# Patient Record
Sex: MALE | Race: OTHER | HISPANIC OR LATINO | URBAN - METROPOLITAN AREA
[De-identification: names, ages, dates, MRNs, and addresses within clinical notes are randomized per-mention and may not be internally consistent; named-entity substitution may affect disease eponyms.]

---

## 2017-01-13 ENCOUNTER — OUTPATIENT (OUTPATIENT)
Dept: OUTPATIENT SERVICES | Facility: HOSPITAL | Age: 47
LOS: 1 days | End: 2017-01-13
Payer: MEDICARE

## 2017-01-13 DIAGNOSIS — K08.9 DISORDER OF TEETH AND SUPPORTING STRUCTURES, UNSPECIFIED: ICD-10-CM

## 2017-01-13 PROCEDURE — D2391: CPT

## 2017-01-20 DIAGNOSIS — K02.9 DENTAL CARIES, UNSPECIFIED: ICD-10-CM

## 2017-01-25 ENCOUNTER — LABORATORY RESULT (OUTPATIENT)
Age: 47
End: 2017-01-25

## 2017-01-25 ENCOUNTER — APPOINTMENT (OUTPATIENT)
Dept: INFECTIOUS DISEASE | Facility: CLINIC | Age: 47
End: 2017-01-25

## 2017-01-25 ENCOUNTER — OUTPATIENT (OUTPATIENT)
Dept: OUTPATIENT SERVICES | Facility: HOSPITAL | Age: 47
LOS: 1 days | End: 2017-01-25
Payer: MEDICARE

## 2017-01-25 VITALS
TEMPERATURE: 98.2 F | SYSTOLIC BLOOD PRESSURE: 122 MMHG | OXYGEN SATURATION: 97 % | HEIGHT: 68 IN | WEIGHT: 182 LBS | BODY MASS INDEX: 27.58 KG/M2 | DIASTOLIC BLOOD PRESSURE: 71 MMHG | HEART RATE: 74 BPM

## 2017-01-25 DIAGNOSIS — B20 HUMAN IMMUNODEFICIENCY VIRUS [HIV] DISEASE: ICD-10-CM

## 2017-01-25 LAB
APPEARANCE UR: ABNORMAL
BACTERIA # UR AUTO: NEGATIVE — SIGNIFICANT CHANGE UP
BILIRUB UR-MCNC: NEGATIVE — SIGNIFICANT CHANGE UP
COLOR SPEC: YELLOW — SIGNIFICANT CHANGE UP
DIFF PNL FLD: NEGATIVE — SIGNIFICANT CHANGE UP
EPI CELLS # UR: 0 /HPF — SIGNIFICANT CHANGE UP (ref 0–5)
GLUCOSE UR QL: NEGATIVE MG/DL — SIGNIFICANT CHANGE UP
HCT VFR BLD CALC: 45.3 % — SIGNIFICANT CHANGE UP (ref 39–50)
HGB BLD-MCNC: 14.5 G/DL — SIGNIFICANT CHANGE UP (ref 13–17)
HYALINE CASTS # UR AUTO: 1 /LPF — SIGNIFICANT CHANGE UP (ref 0–7)
KETONES UR-MCNC: NEGATIVE — SIGNIFICANT CHANGE UP
LEUKOCYTE ESTERASE UR-ACNC: NEGATIVE — SIGNIFICANT CHANGE UP
MCHC RBC-ENTMCNC: 30.7 PG — SIGNIFICANT CHANGE UP (ref 27–34)
MCHC RBC-ENTMCNC: 32 GM/DL — SIGNIFICANT CHANGE UP (ref 32–36)
MCV RBC AUTO: 96 FL — SIGNIFICANT CHANGE UP (ref 80–100)
NITRITE UR-MCNC: NEGATIVE — SIGNIFICANT CHANGE UP
PH UR: 7.5 — SIGNIFICANT CHANGE UP (ref 5–8)
PLATELET # BLD AUTO: 186 K/UL — SIGNIFICANT CHANGE UP (ref 150–400)
PROT UR-MCNC: NEGATIVE MG/DL — SIGNIFICANT CHANGE UP
RBC # BLD: 4.72 M/UL — SIGNIFICANT CHANGE UP (ref 4.2–5.8)
RBC # FLD: 13.5 % — SIGNIFICANT CHANGE UP (ref 10.3–14.5)
RBC CASTS # UR COMP ASSIST: 2 /HPF — SIGNIFICANT CHANGE UP (ref 0–4)
SP GR SPEC: 1.02 — SIGNIFICANT CHANGE UP (ref 1.01–1.02)
UROBILINOGEN FLD QL: NEGATIVE MG/DL — SIGNIFICANT CHANGE UP
WBC # BLD: 6.43 K/UL — SIGNIFICANT CHANGE UP (ref 3.8–10.5)
WBC # FLD AUTO: 6.43 K/UL — SIGNIFICANT CHANGE UP (ref 3.8–10.5)
WBC UR QL: 0 /HPF — SIGNIFICANT CHANGE UP (ref 0–5)

## 2017-01-25 PROCEDURE — 80061 LIPID PANEL: CPT

## 2017-01-25 PROCEDURE — 90834 PSYTX W PT 45 MINUTES: CPT

## 2017-01-25 PROCEDURE — 85027 COMPLETE CBC AUTOMATED: CPT

## 2017-01-25 PROCEDURE — 86803 HEPATITIS C AB TEST: CPT

## 2017-01-25 PROCEDURE — 36415 COLL VENOUS BLD VENIPUNCTURE: CPT

## 2017-01-25 PROCEDURE — G0103: CPT

## 2017-01-25 PROCEDURE — 84153 ASSAY OF PSA TOTAL: CPT

## 2017-01-25 PROCEDURE — 87491 CHLMYD TRACH DNA AMP PROBE: CPT

## 2017-01-25 PROCEDURE — 82306 VITAMIN D 25 HYDROXY: CPT

## 2017-01-25 PROCEDURE — 83036 HEMOGLOBIN GLYCOSYLATED A1C: CPT

## 2017-01-25 PROCEDURE — G0463: CPT | Mod: 25

## 2017-01-25 PROCEDURE — 86780 TREPONEMA PALLIDUM: CPT

## 2017-01-25 PROCEDURE — 86360 T CELL ABSOLUTE COUNT/RATIO: CPT

## 2017-01-25 PROCEDURE — 81001 URINALYSIS AUTO W/SCOPE: CPT

## 2017-01-25 PROCEDURE — 80053 COMPREHEN METABOLIC PANEL: CPT

## 2017-01-25 PROCEDURE — 86480 TB TEST CELL IMMUN MEASURE: CPT

## 2017-01-25 PROCEDURE — 87591 N.GONORRHOEAE DNA AMP PROB: CPT

## 2017-01-25 PROCEDURE — 87536 HIV-1 QUANT&REVRSE TRNSCRPJ: CPT

## 2017-01-26 LAB
24R-OH-CALCIDIOL SERPL-MCNC: 26.4 NG/ML — LOW (ref 30–100)
4/8 RATIO: 0.84 RATIO — LOW (ref 0.9–3.6)
ABS CD8: 619 /UL — SIGNIFICANT CHANGE UP (ref 136–757)
ALBUMIN SERPL ELPH-MCNC: 4.2 G/DL — SIGNIFICANT CHANGE UP (ref 3.3–5)
ALP SERPL-CCNC: 70 U/L — SIGNIFICANT CHANGE UP (ref 40–120)
ALT FLD-CCNC: 33 U/L — SIGNIFICANT CHANGE UP (ref 10–45)
ANION GAP SERPL CALC-SCNC: 12 MMOL/L — SIGNIFICANT CHANGE UP (ref 5–17)
AST SERPL-CCNC: 31 U/L — SIGNIFICANT CHANGE UP (ref 10–40)
BILIRUB SERPL-MCNC: 0.6 MG/DL — SIGNIFICANT CHANGE UP (ref 0.2–1.2)
BUN SERPL-MCNC: 12 MG/DL — SIGNIFICANT CHANGE UP (ref 7–23)
C TRACH RRNA SPEC QL NAA+PROBE: SIGNIFICANT CHANGE UP
C TRACH RRNA SPEC QL NAA+PROBE: SIGNIFICANT CHANGE UP
CALCIUM SERPL-MCNC: 9.2 MG/DL — SIGNIFICANT CHANGE UP (ref 8.4–10.5)
CD3 BLASTS SPEC-ACNC: 1154 /UL — SIGNIFICANT CHANGE UP (ref 799–2171)
CD3 BLASTS SPEC-ACNC: 64 % — SIGNIFICANT CHANGE UP (ref 59–85)
CD4 %: 29 % — LOW (ref 36–65)
CD8 %: 34 % — SIGNIFICANT CHANGE UP (ref 11–36)
CHLORIDE SERPL-SCNC: 103 MMOL/L — SIGNIFICANT CHANGE UP (ref 96–108)
CHOLEST SERPL-MCNC: 157 MG/DL — SIGNIFICANT CHANGE UP (ref 10–199)
CO2 SERPL-SCNC: 26 MMOL/L — SIGNIFICANT CHANGE UP (ref 22–31)
CREAT SERPL-MCNC: 1.06 MG/DL — SIGNIFICANT CHANGE UP (ref 0.5–1.3)
GC AMPLIFICATION INTERPRETATION: SIGNIFICANT CHANGE UP
GLUCOSE SERPL-MCNC: 71 MG/DL — SIGNIFICANT CHANGE UP (ref 70–99)
HBA1C BLD-MCNC: 5.4 % — SIGNIFICANT CHANGE UP (ref 4–5.6)
HCV AB S/CO SERPL IA: 0.15 S/CO — SIGNIFICANT CHANGE UP
HCV AB SERPL-IMP: SIGNIFICANT CHANGE UP
HDLC SERPL-MCNC: 44 MG/DL — SIGNIFICANT CHANGE UP (ref 40–125)
HIV1 RNA # SERPL NAA+PROBE: SIGNIFICANT CHANGE UP
HIV1 RNA SERPL NAA+PROBE-LOG#: SIGNIFICANT CHANGE UP LG10COP/ML
LIPID PNL WITH DIRECT LDL SERPL: 75 MG/DL — SIGNIFICANT CHANGE UP
N GONORRHOEA RRNA SPEC QL NAA+PROBE: SIGNIFICANT CHANGE UP
POTASSIUM SERPL-MCNC: 4.6 MMOL/L — SIGNIFICANT CHANGE UP (ref 3.5–5.3)
POTASSIUM SERPL-SCNC: 4.6 MMOL/L — SIGNIFICANT CHANGE UP (ref 3.5–5.3)
PROT SERPL-MCNC: 7.5 G/DL — SIGNIFICANT CHANGE UP (ref 6–8.3)
PSA FLD-MCNC: 0.65 NG/ML — SIGNIFICANT CHANGE UP (ref 0–4)
PSA FREE MFR FLD: 0.65 NG/ML — SIGNIFICANT CHANGE UP (ref 0–4)
SODIUM SERPL-SCNC: 141 MMOL/L — SIGNIFICANT CHANGE UP (ref 135–145)
SPECIMEN SOURCE: SIGNIFICANT CHANGE UP
T PALLIDUM AB TITR SER: NEGATIVE — SIGNIFICANT CHANGE UP
T-CELL CD4 SUBSET PNL BLD: 521 /UL — SIGNIFICANT CHANGE UP (ref 489–1457)
TOTAL CHOLESTEROL/HDL RATIO MEASUREMENT: 3.6 RATIO — SIGNIFICANT CHANGE UP (ref 3.4–9.6)
TRIGL SERPL-MCNC: 189 MG/DL — HIGH (ref 10–149)

## 2017-01-27 LAB
M TB TUBERC IFN-G BLD QL: -0.02 IU/ML — SIGNIFICANT CHANGE UP
M TB TUBERC IFN-G BLD QL: 0.1 IU/ML — SIGNIFICANT CHANGE UP
M TB TUBERC IFN-G BLD QL: NEGATIVE — SIGNIFICANT CHANGE UP
MITOGEN IGNF BCKGRD COR BLD-ACNC: >10 IU/ML — SIGNIFICANT CHANGE UP

## 2017-01-30 ENCOUNTER — OUTPATIENT (OUTPATIENT)
Dept: OUTPATIENT SERVICES | Facility: HOSPITAL | Age: 47
LOS: 1 days | End: 2017-01-30
Payer: MEDICARE

## 2017-01-30 DIAGNOSIS — K08.9 DISORDER OF TEETH AND SUPPORTING STRUCTURES, UNSPECIFIED: ICD-10-CM

## 2017-01-30 PROCEDURE — D2391: CPT

## 2017-01-30 PROCEDURE — D2392: CPT

## 2017-01-31 DIAGNOSIS — Z85.71 PERSONAL HISTORY OF HODGKIN LYMPHOMA: ICD-10-CM

## 2017-02-01 DIAGNOSIS — K02.9 DENTAL CARIES, UNSPECIFIED: ICD-10-CM

## 2017-02-03 ENCOUNTER — RX RENEWAL (OUTPATIENT)
Age: 47
End: 2017-02-03

## 2017-02-08 ENCOUNTER — RX RENEWAL (OUTPATIENT)
Age: 47
End: 2017-02-08

## 2017-03-01 ENCOUNTER — OUTPATIENT (OUTPATIENT)
Dept: OUTPATIENT SERVICES | Facility: HOSPITAL | Age: 47
LOS: 1 days | End: 2017-03-01
Payer: MEDICARE

## 2017-03-01 DIAGNOSIS — K08.9 DISORDER OF TEETH AND SUPPORTING STRUCTURES, UNSPECIFIED: ICD-10-CM

## 2017-03-01 PROCEDURE — D2392: CPT

## 2017-03-01 PROCEDURE — D2391: CPT

## 2017-03-02 DIAGNOSIS — K02.9 DENTAL CARIES, UNSPECIFIED: ICD-10-CM

## 2017-04-07 ENCOUNTER — OUTPATIENT (OUTPATIENT)
Dept: OUTPATIENT SERVICES | Facility: HOSPITAL | Age: 47
LOS: 1 days | End: 2017-04-07
Payer: MEDICARE

## 2017-04-07 DIAGNOSIS — K08.9 DISORDER OF TEETH AND SUPPORTING STRUCTURES, UNSPECIFIED: ICD-10-CM

## 2017-04-07 PROCEDURE — D2392: CPT

## 2017-04-10 DIAGNOSIS — K02.9 DENTAL CARIES, UNSPECIFIED: ICD-10-CM

## 2017-05-03 ENCOUNTER — RX RENEWAL (OUTPATIENT)
Age: 47
End: 2017-05-03

## 2017-05-31 ENCOUNTER — APPOINTMENT (OUTPATIENT)
Dept: INFECTIOUS DISEASE | Facility: CLINIC | Age: 47
End: 2017-05-31

## 2017-05-31 ENCOUNTER — OUTPATIENT (OUTPATIENT)
Dept: OUTPATIENT SERVICES | Facility: HOSPITAL | Age: 47
LOS: 1 days | End: 2017-05-31
Payer: MEDICARE

## 2017-05-31 VITALS
SYSTOLIC BLOOD PRESSURE: 132 MMHG | BODY MASS INDEX: 27.89 KG/M2 | DIASTOLIC BLOOD PRESSURE: 86 MMHG | OXYGEN SATURATION: 99 % | WEIGHT: 184 LBS | HEIGHT: 68 IN | HEART RATE: 80 BPM | TEMPERATURE: 97.5 F

## 2017-05-31 DIAGNOSIS — B20 HUMAN IMMUNODEFICIENCY VIRUS [HIV] DISEASE: ICD-10-CM

## 2017-05-31 PROCEDURE — G0463: CPT

## 2017-05-31 RX ORDER — ASCORBIC ACID 500 MG
TABLET,CHEWABLE ORAL
Refills: 0 | Status: ACTIVE | COMMUNITY
Start: 2017-05-31

## 2017-06-01 LAB
25(OH)D3 SERPL-MCNC: 26.4 NG/ML
ALBUMIN SERPL ELPH-MCNC: 4.1 G/DL
ALP BLD-CCNC: 72 U/L
ALT SERPL-CCNC: 22 U/L
ANION GAP SERPL CALC-SCNC: 17 MMOL/L
APPEARANCE: ABNORMAL
AST SERPL-CCNC: 23 U/L
BACTERIA: NEGATIVE
BASOPHILS # BLD AUTO: 0.01 K/UL
BASOPHILS NFR BLD AUTO: 0.1 %
BILIRUB SERPL-MCNC: 0.5 MG/DL
BILIRUBIN URINE: NEGATIVE
BLOOD URINE: NEGATIVE
BUN SERPL-MCNC: 13 MG/DL
C TRACH RRNA SPEC QL NAA+PROBE: NORMAL
CALCIUM SERPL-MCNC: 9.3 MG/DL
CHLORIDE SERPL-SCNC: 101 MMOL/L
CHOLEST SERPL-MCNC: 142 MG/DL
CHOLEST/HDLC SERPL: 3.2 RATIO
CO2 SERPL-SCNC: 24 MMOL/L
COLOR: ABNORMAL
CREAT SERPL-MCNC: 1.15 MG/DL
EOSINOPHIL # BLD AUTO: 0.08 K/UL
EOSINOPHIL NFR BLD AUTO: 1.1 %
GLUCOSE QUALITATIVE U: NORMAL MG/DL
GLUCOSE SERPL-MCNC: 72 MG/DL
HBA1C MFR BLD HPLC: 5.5 %
HCT VFR BLD CALC: 43.5 %
HCV AB SER QL: NONREACTIVE
HCV S/CO RATIO: 0.16 S/CO
HDLC SERPL-MCNC: 44 MG/DL
HGB BLD-MCNC: 14.4 G/DL
HYALINE CASTS: 0 /LPF
IMM GRANULOCYTES NFR BLD AUTO: 0.1 %
KETONES URINE: NEGATIVE
LDLC SERPL CALC-MCNC: 70 MG/DL
LEUKOCYTE ESTERASE URINE: NEGATIVE
LYMPHOCYTES # BLD AUTO: 1.69 K/UL
LYMPHOCYTES NFR BLD AUTO: 23.6 %
MAN DIFF?: NORMAL
MCHC RBC-ENTMCNC: 30.3 PG
MCHC RBC-ENTMCNC: 33.1 GM/DL
MCV RBC AUTO: 91.4 FL
MICROSCOPIC-UA: NORMAL
MONOCYTES # BLD AUTO: 0.45 K/UL
MONOCYTES NFR BLD AUTO: 6.3 %
N GONORRHOEA RRNA SPEC QL NAA+PROBE: NORMAL
NEUTROPHILS # BLD AUTO: 4.91 K/UL
NEUTROPHILS NFR BLD AUTO: 68.8 %
NITRITE URINE: NEGATIVE
PH URINE: 7.5
PLATELET # BLD AUTO: 198 K/UL
POTASSIUM SERPL-SCNC: 4.3 MMOL/L
PROT SERPL-MCNC: 7.7 G/DL
PROTEIN URINE: NEGATIVE MG/DL
RBC # BLD: 4.76 M/UL
RBC # FLD: 13.2 %
RED BLOOD CELLS URINE: 5 /HPF
SODIUM SERPL-SCNC: 142 MMOL/L
SOURCE AMPLIFICATION: NORMAL
SPECIFIC GRAVITY URINE: 1.02
SQUAMOUS EPITHELIAL CELLS: 1 /HPF
T PALLIDUM AB SER QL IA: NEGATIVE
TRIGL SERPL-MCNC: 138 MG/DL
URINE COMMENTS: NORMAL
UROBILINOGEN URINE: 1 MG/DL
WBC # FLD AUTO: 7.15 K/UL
WHITE BLOOD CELLS URINE: 0 /HPF

## 2017-06-02 LAB
ADJUSTED MITOGEN: >10 IU/ML
ADJUSTED TB AG: 0 IU/ML
CD3 CELLS # BLD: 1169 /UL
CD3 CELLS NFR BLD: 64 %
CD3+CD4+ CELLS # BLD: 524 /UL
CD3+CD4+ CELLS NFR BLD: 29 %
CD3+CD4+ CELLS/CD3+CD8+ CLL SPEC: 0.84 RATIO
CD3+CD8+ CELLS # SPEC: 624 /UL
CD3+CD8+ CELLS NFR BLD: 34 %
M TB IFN-G BLD-IMP: NEGATIVE
QUANTIFERON GOLD NIL: 0.04 IU/ML

## 2017-06-03 LAB
HIV1 RNA # SERPL NAA+PROBE: NOT DETECTED COPIES/ML
VIRAL LOAD LOG: NOT DETECTED LG10COP/ML

## 2017-06-06 DIAGNOSIS — Z85.71 PERSONAL HISTORY OF HODGKIN LYMPHOMA: ICD-10-CM

## 2017-06-06 DIAGNOSIS — R03.0 ELEVATED BLOOD-PRESSURE READING, WITHOUT DIAGNOSIS OF HYPERTENSION: ICD-10-CM

## 2017-06-21 ENCOUNTER — OUTPATIENT (OUTPATIENT)
Dept: OUTPATIENT SERVICES | Facility: HOSPITAL | Age: 47
LOS: 1 days | End: 2017-06-21
Payer: MEDICARE

## 2017-06-21 DIAGNOSIS — K08.9 DISORDER OF TEETH AND SUPPORTING STRUCTURES, UNSPECIFIED: ICD-10-CM

## 2017-06-21 PROCEDURE — D0120: CPT

## 2017-06-21 PROCEDURE — D1110: CPT

## 2017-06-22 DIAGNOSIS — Z01.20 ENCOUNTER FOR DENTAL EXAMINATION AND CLEANING WITHOUT ABNORMAL FINDINGS: ICD-10-CM

## 2017-07-03 ENCOUNTER — RX RENEWAL (OUTPATIENT)
Age: 47
End: 2017-07-03

## 2017-07-14 ENCOUNTER — RX RENEWAL (OUTPATIENT)
Age: 47
End: 2017-07-14

## 2017-07-19 ENCOUNTER — OUTPATIENT (OUTPATIENT)
Dept: OUTPATIENT SERVICES | Facility: HOSPITAL | Age: 47
LOS: 1 days | End: 2017-07-19
Payer: MEDICARE

## 2017-07-19 DIAGNOSIS — K08.9 DISORDER OF TEETH AND SUPPORTING STRUCTURES, UNSPECIFIED: ICD-10-CM

## 2017-07-19 PROCEDURE — D2391: CPT

## 2017-07-21 DIAGNOSIS — K02.9 DENTAL CARIES, UNSPECIFIED: ICD-10-CM

## 2017-09-01 ENCOUNTER — RX RENEWAL (OUTPATIENT)
Age: 47
End: 2017-09-01

## 2017-11-15 ENCOUNTER — MEDICATION RENEWAL (OUTPATIENT)
Age: 47
End: 2017-11-15

## 2017-11-16 ENCOUNTER — MEDICATION RENEWAL (OUTPATIENT)
Age: 47
End: 2017-11-16

## 2017-12-05 ENCOUNTER — TRANSCRIPTION ENCOUNTER (OUTPATIENT)
Age: 47
End: 2017-12-05

## 2017-12-06 ENCOUNTER — OUTPATIENT (OUTPATIENT)
Dept: OUTPATIENT SERVICES | Facility: HOSPITAL | Age: 47
LOS: 1 days | End: 2017-12-06
Payer: MEDICARE

## 2017-12-06 ENCOUNTER — LABORATORY RESULT (OUTPATIENT)
Age: 47
End: 2017-12-06

## 2017-12-06 ENCOUNTER — APPOINTMENT (OUTPATIENT)
Dept: INFECTIOUS DISEASE | Facility: CLINIC | Age: 47
End: 2017-12-06
Payer: MEDICARE

## 2017-12-06 VITALS
BODY MASS INDEX: 27.89 KG/M2 | WEIGHT: 184 LBS | HEIGHT: 68 IN | OXYGEN SATURATION: 99 % | DIASTOLIC BLOOD PRESSURE: 84 MMHG | SYSTOLIC BLOOD PRESSURE: 122 MMHG | HEART RATE: 88 BPM | TEMPERATURE: 98.4 F

## 2017-12-06 DIAGNOSIS — B20 HUMAN IMMUNODEFICIENCY VIRUS [HIV] DISEASE: ICD-10-CM

## 2017-12-06 LAB
24R-OH-CALCIDIOL SERPL-MCNC: 27.3 NG/ML — LOW (ref 30–80)
ALBUMIN SERPL ELPH-MCNC: 4.2 G/DL — SIGNIFICANT CHANGE UP (ref 3.3–5)
ALP SERPL-CCNC: 71 U/L — SIGNIFICANT CHANGE UP (ref 40–120)
ALT FLD-CCNC: 25 U/L — SIGNIFICANT CHANGE UP (ref 10–45)
ANION GAP SERPL CALC-SCNC: 11 MMOL/L — SIGNIFICANT CHANGE UP (ref 5–17)
APPEARANCE UR: CLEAR — SIGNIFICANT CHANGE UP
AST SERPL-CCNC: 23 U/L — SIGNIFICANT CHANGE UP (ref 10–40)
BACTERIA # UR AUTO: NEGATIVE — SIGNIFICANT CHANGE UP
BILIRUB SERPL-MCNC: 0.5 MG/DL — SIGNIFICANT CHANGE UP (ref 0.2–1.2)
BILIRUB UR-MCNC: NEGATIVE — SIGNIFICANT CHANGE UP
BUN SERPL-MCNC: 14 MG/DL — SIGNIFICANT CHANGE UP (ref 7–23)
CALCIUM SERPL-MCNC: 9.4 MG/DL — SIGNIFICANT CHANGE UP (ref 8.4–10.5)
CHLORIDE SERPL-SCNC: 105 MMOL/L — SIGNIFICANT CHANGE UP (ref 96–108)
CHOLEST SERPL-MCNC: 172 MG/DL — SIGNIFICANT CHANGE UP (ref 10–199)
CO2 SERPL-SCNC: 26 MMOL/L — SIGNIFICANT CHANGE UP (ref 22–31)
COLOR SPEC: YELLOW — SIGNIFICANT CHANGE UP
CREAT SERPL-MCNC: 1.14 MG/DL — SIGNIFICANT CHANGE UP (ref 0.5–1.3)
DIFF PNL FLD: NEGATIVE — SIGNIFICANT CHANGE UP
EPI CELLS # UR: 0 /HPF — SIGNIFICANT CHANGE UP (ref 0–5)
GLUCOSE SERPL-MCNC: 91 MG/DL — SIGNIFICANT CHANGE UP (ref 70–99)
GLUCOSE UR QL: NEGATIVE MG/DL — SIGNIFICANT CHANGE UP
HBA1C BLD-MCNC: 5.3 % — SIGNIFICANT CHANGE UP (ref 4–5.6)
HCT VFR BLD CALC: 45.2 % — SIGNIFICANT CHANGE UP (ref 39–50)
HDLC SERPL-MCNC: 41 MG/DL — SIGNIFICANT CHANGE UP (ref 40–125)
HGB BLD-MCNC: 14.9 G/DL — SIGNIFICANT CHANGE UP (ref 13–17)
HYALINE CASTS # UR AUTO: 0 /LPF — SIGNIFICANT CHANGE UP (ref 0–7)
KETONES UR-MCNC: NEGATIVE — SIGNIFICANT CHANGE UP
LEUKOCYTE ESTERASE UR-ACNC: NEGATIVE — SIGNIFICANT CHANGE UP
LIPID PNL WITH DIRECT LDL SERPL: 93 MG/DL — SIGNIFICANT CHANGE UP
MCHC RBC-ENTMCNC: 30.7 PG — SIGNIFICANT CHANGE UP (ref 27–34)
MCHC RBC-ENTMCNC: 33 GM/DL — SIGNIFICANT CHANGE UP (ref 32–36)
MCV RBC AUTO: 93 FL — SIGNIFICANT CHANGE UP (ref 80–100)
NITRITE UR-MCNC: NEGATIVE — SIGNIFICANT CHANGE UP
PH UR: 6 — SIGNIFICANT CHANGE UP (ref 5–8)
PLATELET # BLD AUTO: 202 K/UL — SIGNIFICANT CHANGE UP (ref 150–400)
POTASSIUM SERPL-MCNC: 4.1 MMOL/L — SIGNIFICANT CHANGE UP (ref 3.5–5.3)
POTASSIUM SERPL-SCNC: 4.1 MMOL/L — SIGNIFICANT CHANGE UP (ref 3.5–5.3)
PROT SERPL-MCNC: 7.7 G/DL — SIGNIFICANT CHANGE UP (ref 6–8.3)
PROT UR-MCNC: NEGATIVE MG/DL — SIGNIFICANT CHANGE UP
RBC # BLD: 4.86 M/UL — SIGNIFICANT CHANGE UP (ref 4.2–5.8)
RBC # FLD: 13.4 % — SIGNIFICANT CHANGE UP (ref 10.3–14.5)
RBC CASTS # UR COMP ASSIST: 2 /HPF — SIGNIFICANT CHANGE UP (ref 0–4)
SODIUM SERPL-SCNC: 142 MMOL/L — SIGNIFICANT CHANGE UP (ref 135–145)
SP GR SPEC: 1.03 — HIGH (ref 1.01–1.02)
T PALLIDUM AB TITR SER: NEGATIVE — SIGNIFICANT CHANGE UP
TOTAL CHOLESTEROL/HDL RATIO MEASUREMENT: 4.2 RATIO — SIGNIFICANT CHANGE UP (ref 3.4–9.6)
TRIGL SERPL-MCNC: 191 MG/DL — HIGH (ref 10–149)
UROBILINOGEN FLD QL: NEGATIVE MG/DL — SIGNIFICANT CHANGE UP
WBC # BLD: 5.77 K/UL — SIGNIFICANT CHANGE UP (ref 3.8–10.5)
WBC # FLD AUTO: 5.77 K/UL — SIGNIFICANT CHANGE UP (ref 3.8–10.5)
WBC UR QL: 0 /HPF — SIGNIFICANT CHANGE UP (ref 0–5)

## 2017-12-06 PROCEDURE — 85027 COMPLETE CBC AUTOMATED: CPT

## 2017-12-06 PROCEDURE — 83036 HEMOGLOBIN GLYCOSYLATED A1C: CPT

## 2017-12-06 PROCEDURE — 87536 HIV-1 QUANT&REVRSE TRNSCRPJ: CPT

## 2017-12-06 PROCEDURE — 81001 URINALYSIS AUTO W/SCOPE: CPT

## 2017-12-06 PROCEDURE — 80061 LIPID PANEL: CPT

## 2017-12-06 PROCEDURE — 90834 PSYTX W PT 45 MINUTES: CPT

## 2017-12-06 PROCEDURE — 80053 COMPREHEN METABOLIC PANEL: CPT

## 2017-12-06 PROCEDURE — 86780 TREPONEMA PALLIDUM: CPT

## 2017-12-06 PROCEDURE — 99214 OFFICE O/P EST MOD 30 MIN: CPT

## 2017-12-06 PROCEDURE — 86360 T CELL ABSOLUTE COUNT/RATIO: CPT

## 2017-12-06 PROCEDURE — G0009: CPT

## 2017-12-06 PROCEDURE — 90686 IIV4 VACC NO PRSV 0.5 ML IM: CPT

## 2017-12-06 PROCEDURE — G0463: CPT | Mod: 25

## 2017-12-06 PROCEDURE — 86480 TB TEST CELL IMMUN MEASURE: CPT

## 2017-12-06 PROCEDURE — G0008: CPT

## 2017-12-06 PROCEDURE — 90732 PPSV23 VACC 2 YRS+ SUBQ/IM: CPT

## 2017-12-06 PROCEDURE — 82306 VITAMIN D 25 HYDROXY: CPT

## 2017-12-07 LAB
4/8 RATIO: 1.03 RATIO — SIGNIFICANT CHANGE UP (ref 0.9–3.6)
ABS CD8: 677 /UL — SIGNIFICANT CHANGE UP (ref 136–757)
C TRACH RRNA SPEC QL NAA+PROBE: SIGNIFICANT CHANGE UP
CD3 BLASTS SPEC-ACNC: 1402 /UL — SIGNIFICANT CHANGE UP (ref 799–2171)
CD3 BLASTS SPEC-ACNC: 65 % — SIGNIFICANT CHANGE UP (ref 59–85)
CD4 %: 32 % — LOW (ref 36–65)
CD8 %: 32 % — SIGNIFICANT CHANGE UP (ref 11–36)
HIV-1 VIRAL LOAD RESULT: SIGNIFICANT CHANGE UP
HIV1 RNA # SERPL NAA+PROBE: SIGNIFICANT CHANGE UP
HIV1 RNA SER-IMP: SIGNIFICANT CHANGE UP
HIV1 RNA SERPL NAA+PROBE-ACNC: SIGNIFICANT CHANGE UP
HIV1 RNA SERPL NAA+PROBE-LOG#: SIGNIFICANT CHANGE UP LG COP/ML
M TB TUBERC IFN-G BLD QL: 0 IU/ML — SIGNIFICANT CHANGE UP
M TB TUBERC IFN-G BLD QL: 0.03 IU/ML — SIGNIFICANT CHANGE UP
M TB TUBERC IFN-G BLD QL: NEGATIVE — SIGNIFICANT CHANGE UP
MITOGEN IGNF BCKGRD COR BLD-ACNC: 7.66 IU/ML — SIGNIFICANT CHANGE UP
N GONORRHOEA RRNA SPEC QL NAA+PROBE: SIGNIFICANT CHANGE UP
SPECIMEN SOURCE: SIGNIFICANT CHANGE UP
T-CELL CD4 SUBSET PNL BLD: 698 /UL — SIGNIFICANT CHANGE UP (ref 489–1457)

## 2017-12-14 ENCOUNTER — OUTPATIENT (OUTPATIENT)
Dept: OUTPATIENT SERVICES | Facility: HOSPITAL | Age: 47
LOS: 1 days | End: 2017-12-14
Payer: MEDICARE

## 2017-12-14 ENCOUNTER — APPOINTMENT (OUTPATIENT)
Dept: CV DIAGNOSITCS | Facility: HOSPITAL | Age: 47
End: 2017-12-14

## 2017-12-14 DIAGNOSIS — B20 HUMAN IMMUNODEFICIENCY VIRUS [HIV] DISEASE: ICD-10-CM

## 2017-12-14 PROCEDURE — 93350 STRESS TTE ONLY: CPT | Mod: 26

## 2017-12-14 PROCEDURE — 93325 DOPPLER ECHO COLOR FLOW MAPG: CPT | Mod: 26

## 2017-12-14 PROCEDURE — 93320 DOPPLER ECHO COMPLETE: CPT

## 2017-12-14 PROCEDURE — 93320 DOPPLER ECHO COMPLETE: CPT | Mod: 26

## 2017-12-14 PROCEDURE — 93325 DOPPLER ECHO COLOR FLOW MAPG: CPT

## 2017-12-14 PROCEDURE — 93351 STRESS TTE COMPLETE: CPT

## 2017-12-18 DIAGNOSIS — Z85.71 PERSONAL HISTORY OF HODGKIN LYMPHOMA: ICD-10-CM

## 2017-12-18 DIAGNOSIS — Z23 ENCOUNTER FOR IMMUNIZATION: ICD-10-CM

## 2017-12-18 DIAGNOSIS — M25.512 PAIN IN LEFT SHOULDER: ICD-10-CM

## 2018-01-03 ENCOUNTER — OUTPATIENT (OUTPATIENT)
Dept: OUTPATIENT SERVICES | Facility: HOSPITAL | Age: 48
LOS: 1 days | End: 2018-01-03
Payer: MEDICARE

## 2018-01-03 DIAGNOSIS — Z01.20 ENCOUNTER FOR DENTAL EXAMINATION AND CLEANING WITHOUT ABNORMAL FINDINGS: ICD-10-CM

## 2018-01-03 DIAGNOSIS — K08.9 DISORDER OF TEETH AND SUPPORTING STRUCTURES, UNSPECIFIED: ICD-10-CM

## 2018-01-03 PROCEDURE — D0120: CPT

## 2018-01-03 PROCEDURE — D1110: CPT

## 2018-01-22 ENCOUNTER — MEDICATION RENEWAL (OUTPATIENT)
Age: 48
End: 2018-01-22

## 2018-01-22 ENCOUNTER — RX RENEWAL (OUTPATIENT)
Age: 48
End: 2018-01-22

## 2018-02-23 ENCOUNTER — APPOINTMENT (OUTPATIENT)
Dept: INFECTIOUS DISEASE | Facility: CLINIC | Age: 48
End: 2018-02-23
Payer: MEDICARE

## 2018-02-23 PROCEDURE — 90715 TDAP VACCINE 7 YRS/> IM: CPT | Mod: GY,NC

## 2018-02-23 PROCEDURE — 90471 IMMUNIZATION ADMIN: CPT | Mod: GY

## 2018-04-11 ENCOUNTER — APPOINTMENT (OUTPATIENT)
Dept: COLORECTAL SURGERY | Facility: CLINIC | Age: 48
End: 2018-04-11
Payer: MEDICARE

## 2018-04-11 VITALS
RESPIRATION RATE: 14 BRPM | WEIGHT: 185 LBS | HEART RATE: 79 BPM | HEIGHT: 68 IN | BODY MASS INDEX: 28.04 KG/M2 | SYSTOLIC BLOOD PRESSURE: 137 MMHG | OXYGEN SATURATION: 97 % | DIASTOLIC BLOOD PRESSURE: 91 MMHG

## 2018-04-11 PROCEDURE — 46600 DIAGNOSTIC ANOSCOPY SPX: CPT

## 2018-04-11 PROCEDURE — 99203 OFFICE O/P NEW LOW 30 MIN: CPT

## 2018-05-02 ENCOUNTER — LABORATORY RESULT (OUTPATIENT)
Age: 48
End: 2018-05-02

## 2018-05-02 ENCOUNTER — APPOINTMENT (OUTPATIENT)
Dept: INFECTIOUS DISEASE | Facility: CLINIC | Age: 48
End: 2018-05-02
Payer: MEDICARE

## 2018-05-02 ENCOUNTER — OUTPATIENT (OUTPATIENT)
Dept: OUTPATIENT SERVICES | Facility: HOSPITAL | Age: 48
LOS: 1 days | End: 2018-05-02
Payer: MEDICARE

## 2018-05-02 VITALS
SYSTOLIC BLOOD PRESSURE: 120 MMHG | HEART RATE: 96 BPM | WEIGHT: 182 LBS | HEIGHT: 68 IN | DIASTOLIC BLOOD PRESSURE: 90 MMHG | TEMPERATURE: 98.5 F | OXYGEN SATURATION: 98 % | BODY MASS INDEX: 27.58 KG/M2

## 2018-05-02 DIAGNOSIS — M25.512 PAIN IN LEFT SHOULDER: ICD-10-CM

## 2018-05-02 DIAGNOSIS — B20 HUMAN IMMUNODEFICIENCY VIRUS [HIV] DISEASE: ICD-10-CM

## 2018-05-02 PROCEDURE — 90834 PSYTX W PT 45 MINUTES: CPT

## 2018-05-02 PROCEDURE — G0463: CPT | Mod: 25

## 2018-05-02 PROCEDURE — 99214 OFFICE O/P EST MOD 30 MIN: CPT

## 2018-05-02 RX ORDER — CLOSTRIDIUM TETANI TOXOID ANTIGEN (FORMALDEHYDE INACTIVATED), CORYNEBACTERIUM DIPHTHERIAE TOXOID ANTIGEN (FORMALDEHYDE INACTIVATED), BORDETELLA PERTUSSIS TOXOID ANTIGEN (GLUTARALDEHYDE INACTIVATED), BORDETELLA PERTUSSIS FILAMENTOUS HEMAGGLUTININ ANTIGEN (FORMALDEHYDE INACTIVATED), BORDETELLA PERTUSSIS PERTACTIN ANTIGEN, AND BORDETELLA PERTUSSIS FIMBRIAE 2/3 ANTIGEN 5; 2; 2.5; 5; 3; 5 [LF]/.5ML; [LF]/.5ML; UG/.5ML; UG/.5ML; UG/.5ML; UG/.5ML
5-2-15.5 INJECTION, SUSPENSION INTRAMUSCULAR
Qty: 1 | Refills: 0 | Status: COMPLETED | COMMUNITY
Start: 2018-02-28

## 2018-05-09 DIAGNOSIS — M25.512 PAIN IN LEFT SHOULDER: ICD-10-CM

## 2018-05-24 ENCOUNTER — MEDICATION RENEWAL (OUTPATIENT)
Age: 48
End: 2018-05-24

## 2018-07-11 ENCOUNTER — OUTPATIENT (OUTPATIENT)
Dept: OUTPATIENT SERVICES | Facility: HOSPITAL | Age: 48
LOS: 1 days | End: 2018-07-11
Payer: MEDICARE

## 2018-07-11 DIAGNOSIS — K08.9 DISORDER OF TEETH AND SUPPORTING STRUCTURES, UNSPECIFIED: ICD-10-CM

## 2018-07-11 PROCEDURE — D0120: CPT

## 2018-07-11 PROCEDURE — D1110: CPT

## 2018-07-20 DIAGNOSIS — Z01.20 ENCOUNTER FOR DENTAL EXAMINATION AND CLEANING WITHOUT ABNORMAL FINDINGS: ICD-10-CM

## 2018-11-14 ENCOUNTER — LABORATORY RESULT (OUTPATIENT)
Age: 48
End: 2018-11-14

## 2018-11-14 ENCOUNTER — APPOINTMENT (OUTPATIENT)
Dept: INFECTIOUS DISEASE | Facility: CLINIC | Age: 48
End: 2018-11-14
Payer: MEDICARE

## 2018-11-14 ENCOUNTER — APPOINTMENT (OUTPATIENT)
Dept: INFECTIOUS DISEASE | Facility: CLINIC | Age: 48
End: 2018-11-14

## 2018-11-14 ENCOUNTER — OUTPATIENT (OUTPATIENT)
Dept: OUTPATIENT SERVICES | Facility: HOSPITAL | Age: 48
LOS: 1 days | End: 2018-11-14
Payer: MEDICARE

## 2018-11-14 VITALS
TEMPERATURE: 97.1 F | BODY MASS INDEX: 27.37 KG/M2 | WEIGHT: 180 LBS | HEART RATE: 77 BPM | OXYGEN SATURATION: 98 % | SYSTOLIC BLOOD PRESSURE: 138 MMHG | DIASTOLIC BLOOD PRESSURE: 88 MMHG | RESPIRATION RATE: 16 BRPM

## 2018-11-14 DIAGNOSIS — M25.542 PAIN IN JOINTS OF LEFT HAND: ICD-10-CM

## 2018-11-14 DIAGNOSIS — B20 HUMAN IMMUNODEFICIENCY VIRUS [HIV] DISEASE: ICD-10-CM

## 2018-11-14 LAB
24R-OH-CALCIDIOL SERPL-MCNC: 32.9 NG/ML — SIGNIFICANT CHANGE UP (ref 30–80)
4/8 RATIO: 0.98 RATIO — SIGNIFICANT CHANGE UP (ref 0.9–3.6)
ABS CD8: 580 /UL — SIGNIFICANT CHANGE UP (ref 142–740)
APPEARANCE UR: CLEAR — SIGNIFICANT CHANGE UP
BACTERIA # UR AUTO: NEGATIVE — SIGNIFICANT CHANGE UP
BILIRUB UR-MCNC: NEGATIVE — SIGNIFICANT CHANGE UP
C TRACH RRNA SPEC QL NAA+PROBE: SIGNIFICANT CHANGE UP
CD3 BLASTS SPEC-ACNC: 1196 /UL — SIGNIFICANT CHANGE UP (ref 672–1870)
CD3 BLASTS SPEC-ACNC: 65 % — SIGNIFICANT CHANGE UP (ref 59–83)
CD4 %: 31 % — SIGNIFICANT CHANGE UP (ref 30–62)
CD8 %: 32 % — SIGNIFICANT CHANGE UP (ref 12–36)
CHOLEST SERPL-MCNC: 163 MG/DL — SIGNIFICANT CHANGE UP (ref 10–199)
COD CRY URNS QL: ABNORMAL
COLOR SPEC: ABNORMAL
COMMENT - URINE: SIGNIFICANT CHANGE UP
DIFF PNL FLD: NEGATIVE — SIGNIFICANT CHANGE UP
EPI CELLS # UR: 0 /HPF — SIGNIFICANT CHANGE UP (ref 0–5)
GLUCOSE UR QL: NEGATIVE MG/DL — SIGNIFICANT CHANGE UP
HBA1C BLD-MCNC: 5.3 % — SIGNIFICANT CHANGE UP (ref 4–5.6)
HCT VFR BLD CALC: 41.8 % — SIGNIFICANT CHANGE UP (ref 39–50)
HCV AB S/CO SERPL IA: 0.1 S/CO — SIGNIFICANT CHANGE UP
HCV AB SERPL-IMP: SIGNIFICANT CHANGE UP
HDLC SERPL-MCNC: 40 MG/DL — SIGNIFICANT CHANGE UP
HGB BLD-MCNC: 14.5 G/DL — SIGNIFICANT CHANGE UP (ref 13–17)
HYALINE CASTS # UR AUTO: 1 /LPF — SIGNIFICANT CHANGE UP (ref 0–7)
KETONES UR-MCNC: NEGATIVE — SIGNIFICANT CHANGE UP
LEUKOCYTE ESTERASE UR-ACNC: NEGATIVE — SIGNIFICANT CHANGE UP
LIPID PNL WITH DIRECT LDL SERPL: 70 MG/DL — SIGNIFICANT CHANGE UP
MCHC RBC-ENTMCNC: 31.1 PG — SIGNIFICANT CHANGE UP (ref 27–34)
MCHC RBC-ENTMCNC: 34.7 GM/DL — SIGNIFICANT CHANGE UP (ref 32–36)
MCV RBC AUTO: 89.7 FL — SIGNIFICANT CHANGE UP (ref 80–100)
N GONORRHOEA RRNA SPEC QL NAA+PROBE: SIGNIFICANT CHANGE UP
NITRITE UR-MCNC: NEGATIVE — SIGNIFICANT CHANGE UP
PH UR: 7.5 — SIGNIFICANT CHANGE UP (ref 5–8)
PLATELET # BLD AUTO: 170 K/UL — SIGNIFICANT CHANGE UP (ref 150–400)
PROT UR-MCNC: NEGATIVE MG/DL — SIGNIFICANT CHANGE UP
PSA FREE MFR FLD: 0.68 NG/ML — SIGNIFICANT CHANGE UP (ref 0–4)
RBC # BLD: 4.66 M/UL — SIGNIFICANT CHANGE UP (ref 4.2–5.8)
RBC # FLD: 13.5 % — SIGNIFICANT CHANGE UP (ref 10.3–14.5)
RBC CASTS # UR COMP ASSIST: 0 /HPF — SIGNIFICANT CHANGE UP (ref 0–4)
SP GR SPEC: 1.02 — SIGNIFICANT CHANGE UP (ref 1.01–1.02)
SPECIMEN SOURCE: SIGNIFICANT CHANGE UP
T PALLIDUM AB TITR SER: NEGATIVE — SIGNIFICANT CHANGE UP
T-CELL CD4 SUBSET PNL BLD: 568 /UL — SIGNIFICANT CHANGE UP (ref 489–1457)
TOTAL CHOLESTEROL/HDL RATIO MEASUREMENT: 4.1 RATIO — SIGNIFICANT CHANGE UP (ref 3.4–9.6)
TRIGL SERPL-MCNC: 264 MG/DL — HIGH (ref 10–149)
UROBILINOGEN FLD QL: NEGATIVE MG/DL — SIGNIFICANT CHANGE UP
WBC # BLD: 4.92 K/UL — SIGNIFICANT CHANGE UP (ref 3.8–10.5)
WBC # FLD AUTO: 4.92 K/UL — SIGNIFICANT CHANGE UP (ref 3.8–10.5)
WBC UR QL: 0 /HPF — SIGNIFICANT CHANGE UP (ref 0–5)

## 2018-11-14 PROCEDURE — 83036 HEMOGLOBIN GLYCOSYLATED A1C: CPT

## 2018-11-14 PROCEDURE — G0463: CPT | Mod: 25

## 2018-11-14 PROCEDURE — 86360 T CELL ABSOLUTE COUNT/RATIO: CPT

## 2018-11-14 PROCEDURE — 82306 VITAMIN D 25 HYDROXY: CPT

## 2018-11-14 PROCEDURE — G0008: CPT

## 2018-11-14 PROCEDURE — 87491 CHLMYD TRACH DNA AMP PROBE: CPT

## 2018-11-14 PROCEDURE — 90686 IIV4 VACC NO PRSV 0.5 ML IM: CPT

## 2018-11-14 PROCEDURE — 99215 OFFICE O/P EST HI 40 MIN: CPT

## 2018-11-14 PROCEDURE — 85027 COMPLETE CBC AUTOMATED: CPT

## 2018-11-14 PROCEDURE — 86480 TB TEST CELL IMMUN MEASURE: CPT

## 2018-11-14 PROCEDURE — 84153 ASSAY OF PSA TOTAL: CPT

## 2018-11-14 PROCEDURE — 81001 URINALYSIS AUTO W/SCOPE: CPT

## 2018-11-14 PROCEDURE — 80061 LIPID PANEL: CPT

## 2018-11-14 PROCEDURE — 87536 HIV-1 QUANT&REVRSE TRNSCRPJ: CPT

## 2018-11-14 PROCEDURE — 86803 HEPATITIS C AB TEST: CPT

## 2018-11-14 PROCEDURE — 86780 TREPONEMA PALLIDUM: CPT

## 2018-11-14 PROCEDURE — 87591 N.GONORRHOEAE DNA AMP PROB: CPT

## 2018-11-15 LAB
GAMMA INTERFERON BACKGROUND BLD IA-ACNC: 0.02 IU/ML — SIGNIFICANT CHANGE UP
HIV-1 VIRAL LOAD RESULT: SIGNIFICANT CHANGE UP
HIV1 RNA # SERPL NAA+PROBE: SIGNIFICANT CHANGE UP
HIV1 RNA SER-IMP: SIGNIFICANT CHANGE UP
HIV1 RNA SERPL NAA+PROBE-ACNC: SIGNIFICANT CHANGE UP
HIV1 RNA SERPL NAA+PROBE-LOG#: SIGNIFICANT CHANGE UP LG COP/ML
M TB IFN-G BLD-IMP: NEGATIVE — SIGNIFICANT CHANGE UP
M TB IFN-G CD4+ BCKGRND COR BLD-ACNC: -0.01 IU/ML — SIGNIFICANT CHANGE UP
M TB IFN-G CD4+CD8+ BCKGRND COR BLD-ACNC: 0 IU/ML — SIGNIFICANT CHANGE UP
QUANT TB PLUS MITOGEN MINUS NIL: 8.02 IU/ML — SIGNIFICANT CHANGE UP

## 2018-11-16 ENCOUNTER — RX RENEWAL (OUTPATIENT)
Age: 48
End: 2018-11-16

## 2018-11-20 PROBLEM — M25.542 PAIN INVOLVING JOINT OF FINGER OF LEFT HAND: Status: ACTIVE | Noted: 2018-11-20

## 2018-11-23 DIAGNOSIS — I10 ESSENTIAL (PRIMARY) HYPERTENSION: ICD-10-CM

## 2018-11-23 DIAGNOSIS — M25.542 PAIN IN JOINTS OF LEFT HAND: ICD-10-CM

## 2018-11-23 DIAGNOSIS — Z23 ENCOUNTER FOR IMMUNIZATION: ICD-10-CM

## 2019-02-20 ENCOUNTER — OUTPATIENT (OUTPATIENT)
Dept: OUTPATIENT SERVICES | Facility: HOSPITAL | Age: 49
LOS: 1 days | End: 2019-02-20
Payer: MEDICARE

## 2019-02-20 DIAGNOSIS — K08.9 DISORDER OF TEETH AND SUPPORTING STRUCTURES, UNSPECIFIED: ICD-10-CM

## 2019-02-20 PROCEDURE — D0274: CPT

## 2019-02-20 PROCEDURE — D1110: CPT

## 2019-02-20 PROCEDURE — D0120: CPT

## 2019-02-21 DIAGNOSIS — Z01.20 ENCOUNTER FOR DENTAL EXAMINATION AND CLEANING WITHOUT ABNORMAL FINDINGS: ICD-10-CM

## 2019-03-08 ENCOUNTER — OUTPATIENT (OUTPATIENT)
Dept: OUTPATIENT SERVICES | Facility: HOSPITAL | Age: 49
LOS: 1 days | End: 2019-03-08
Payer: MEDICARE

## 2019-03-08 DIAGNOSIS — K08.9 DISORDER OF TEETH AND SUPPORTING STRUCTURES, UNSPECIFIED: ICD-10-CM

## 2019-03-08 PROCEDURE — D2392: CPT

## 2019-03-15 DIAGNOSIS — K02.9 DENTAL CARIES, UNSPECIFIED: ICD-10-CM

## 2019-03-19 ENCOUNTER — RX RENEWAL (OUTPATIENT)
Age: 49
End: 2019-03-19

## 2019-04-29 ENCOUNTER — RX RENEWAL (OUTPATIENT)
Age: 49
End: 2019-04-29

## 2019-05-15 ENCOUNTER — OUTPATIENT (OUTPATIENT)
Dept: OUTPATIENT SERVICES | Facility: HOSPITAL | Age: 49
LOS: 1 days | End: 2019-05-15
Payer: MEDICARE

## 2019-05-15 ENCOUNTER — LABORATORY RESULT (OUTPATIENT)
Age: 49
End: 2019-05-15

## 2019-05-15 ENCOUNTER — APPOINTMENT (OUTPATIENT)
Dept: INFECTIOUS DISEASE | Facility: CLINIC | Age: 49
End: 2019-05-15
Payer: MEDICARE

## 2019-05-15 ENCOUNTER — TRANSCRIPTION ENCOUNTER (OUTPATIENT)
Age: 49
End: 2019-05-15

## 2019-05-15 VITALS
HEART RATE: 84 BPM | DIASTOLIC BLOOD PRESSURE: 85 MMHG | SYSTOLIC BLOOD PRESSURE: 134 MMHG | WEIGHT: 183 LBS | BODY MASS INDEX: 27.83 KG/M2 | TEMPERATURE: 98.1 F | OXYGEN SATURATION: 98 %

## 2019-05-15 DIAGNOSIS — B20 HUMAN IMMUNODEFICIENCY VIRUS [HIV] DISEASE: ICD-10-CM

## 2019-05-15 LAB
24R-OH-CALCIDIOL SERPL-MCNC: 36.5 NG/ML — SIGNIFICANT CHANGE UP (ref 30–80)
4/8 RATIO: 1.02 RATIO — SIGNIFICANT CHANGE UP (ref 0.9–3.6)
ABS CD8: 605 /UL — SIGNIFICANT CHANGE UP (ref 142–740)
ALBUMIN SERPL ELPH-MCNC: 4.2 G/DL — SIGNIFICANT CHANGE UP (ref 3.3–5)
ALP SERPL-CCNC: 65 U/L — SIGNIFICANT CHANGE UP (ref 40–120)
ALT FLD-CCNC: 15 U/L — SIGNIFICANT CHANGE UP (ref 10–45)
ANION GAP SERPL CALC-SCNC: 8 MMOL/L — SIGNIFICANT CHANGE UP (ref 5–17)
APPEARANCE UR: CLEAR — SIGNIFICANT CHANGE UP
AST SERPL-CCNC: 19 U/L — SIGNIFICANT CHANGE UP (ref 10–40)
BACTERIA # UR AUTO: NEGATIVE — SIGNIFICANT CHANGE UP
BILIRUB SERPL-MCNC: 0.5 MG/DL — SIGNIFICANT CHANGE UP (ref 0.2–1.2)
BILIRUB UR-MCNC: NEGATIVE — SIGNIFICANT CHANGE UP
BUN SERPL-MCNC: 13 MG/DL — SIGNIFICANT CHANGE UP (ref 7–23)
C TRACH RRNA SPEC QL NAA+PROBE: SIGNIFICANT CHANGE UP
CALCIUM SERPL-MCNC: 9.4 MG/DL — SIGNIFICANT CHANGE UP (ref 8.4–10.5)
CD3 BLASTS SPEC-ACNC: 1262 /UL — SIGNIFICANT CHANGE UP (ref 672–1870)
CD3 BLASTS SPEC-ACNC: 66 % — SIGNIFICANT CHANGE UP (ref 59–83)
CD4 %: 32 % — SIGNIFICANT CHANGE UP (ref 30–62)
CD8 %: 32 % — SIGNIFICANT CHANGE UP (ref 12–36)
CHLORIDE SERPL-SCNC: 104 MMOL/L — SIGNIFICANT CHANGE UP (ref 96–108)
CHOLEST SERPL-MCNC: 184 MG/DL — SIGNIFICANT CHANGE UP (ref 10–199)
CO2 SERPL-SCNC: 28 MMOL/L — SIGNIFICANT CHANGE UP (ref 22–31)
COLOR SPEC: YELLOW — SIGNIFICANT CHANGE UP
CREAT SERPL-MCNC: 1.11 MG/DL — SIGNIFICANT CHANGE UP (ref 0.5–1.3)
DIFF PNL FLD: NEGATIVE — SIGNIFICANT CHANGE UP
EPI CELLS # UR: 0 /HPF — SIGNIFICANT CHANGE UP (ref 0–5)
ESTIMATED AVERAGE GLUCOSE: 108 MG/DL — SIGNIFICANT CHANGE UP (ref 68–114)
GLUCOSE SERPL-MCNC: 89 MG/DL — SIGNIFICANT CHANGE UP (ref 70–99)
GLUCOSE UR QL: NEGATIVE — SIGNIFICANT CHANGE UP
HBA1C BLD-MCNC: 5.4 % — SIGNIFICANT CHANGE UP (ref 4–5.6)
HCT VFR BLD CALC: 44.6 % — SIGNIFICANT CHANGE UP (ref 39–50)
HDLC SERPL-MCNC: 36 MG/DL — LOW
HGB BLD-MCNC: 14.8 G/DL — SIGNIFICANT CHANGE UP (ref 13–17)
HYALINE CASTS # UR AUTO: 0 /LPF — SIGNIFICANT CHANGE UP (ref 0–7)
KETONES UR-MCNC: NEGATIVE — SIGNIFICANT CHANGE UP
LEUKOCYTE ESTERASE UR-ACNC: NEGATIVE — SIGNIFICANT CHANGE UP
LIPID PNL WITH DIRECT LDL SERPL: 88 MG/DL — SIGNIFICANT CHANGE UP
MCHC RBC-ENTMCNC: 30.4 PG — SIGNIFICANT CHANGE UP (ref 27–34)
MCHC RBC-ENTMCNC: 33.2 GM/DL — SIGNIFICANT CHANGE UP (ref 32–36)
MCV RBC AUTO: 91.6 FL — SIGNIFICANT CHANGE UP (ref 80–100)
N GONORRHOEA RRNA SPEC QL NAA+PROBE: SIGNIFICANT CHANGE UP
NITRITE UR-MCNC: NEGATIVE — SIGNIFICANT CHANGE UP
PH UR: 7 — SIGNIFICANT CHANGE UP (ref 5–8)
PLATELET # BLD AUTO: 202 K/UL — SIGNIFICANT CHANGE UP (ref 150–400)
POTASSIUM SERPL-MCNC: 4.1 MMOL/L — SIGNIFICANT CHANGE UP (ref 3.5–5.3)
POTASSIUM SERPL-SCNC: 4.1 MMOL/L — SIGNIFICANT CHANGE UP (ref 3.5–5.3)
PROT SERPL-MCNC: 6.9 G/DL — SIGNIFICANT CHANGE UP (ref 6–8.3)
PROT UR-MCNC: SIGNIFICANT CHANGE UP
PSA SERPL-MCNC: 0.83 NG/ML — SIGNIFICANT CHANGE UP (ref 0–4)
RBC # BLD: 4.87 M/UL — SIGNIFICANT CHANGE UP (ref 4.2–5.8)
RBC # FLD: 12.5 % — SIGNIFICANT CHANGE UP (ref 10.3–14.5)
RBC CASTS # UR COMP ASSIST: 3 /HPF — SIGNIFICANT CHANGE UP (ref 0–4)
SODIUM SERPL-SCNC: 140 MMOL/L — SIGNIFICANT CHANGE UP (ref 135–145)
SP GR SPEC: 1.03 — HIGH (ref 1.01–1.02)
SPECIMEN SOURCE: SIGNIFICANT CHANGE UP
T PALLIDUM AB TITR SER: NEGATIVE — SIGNIFICANT CHANGE UP
T-CELL CD4 SUBSET PNL BLD: 614 /UL — SIGNIFICANT CHANGE UP (ref 489–1457)
TOTAL CHOLESTEROL/HDL RATIO MEASUREMENT: 5.1 RATIO — SIGNIFICANT CHANGE UP (ref 3.4–9.6)
TRIGL SERPL-MCNC: 302 MG/DL — HIGH (ref 10–149)
UROBILINOGEN FLD QL: SIGNIFICANT CHANGE UP
WBC # BLD: 5.84 K/UL — SIGNIFICANT CHANGE UP (ref 3.8–10.5)
WBC # FLD AUTO: 5.84 K/UL — SIGNIFICANT CHANGE UP (ref 3.8–10.5)
WBC UR QL: 0 /HPF — SIGNIFICANT CHANGE UP (ref 0–5)

## 2019-05-15 PROCEDURE — 81001 URINALYSIS AUTO W/SCOPE: CPT

## 2019-05-15 PROCEDURE — 86780 TREPONEMA PALLIDUM: CPT

## 2019-05-15 PROCEDURE — 87591 N.GONORRHOEAE DNA AMP PROB: CPT

## 2019-05-15 PROCEDURE — 84403 ASSAY OF TOTAL TESTOSTERONE: CPT

## 2019-05-15 PROCEDURE — 99215 OFFICE O/P EST HI 40 MIN: CPT

## 2019-05-15 PROCEDURE — 82306 VITAMIN D 25 HYDROXY: CPT

## 2019-05-15 PROCEDURE — 80061 LIPID PANEL: CPT

## 2019-05-15 PROCEDURE — 86803 HEPATITIS C AB TEST: CPT

## 2019-05-15 PROCEDURE — 85027 COMPLETE CBC AUTOMATED: CPT

## 2019-05-15 PROCEDURE — 83036 HEMOGLOBIN GLYCOSYLATED A1C: CPT

## 2019-05-15 PROCEDURE — 84153 ASSAY OF PSA TOTAL: CPT

## 2019-05-15 PROCEDURE — 90834 PSYTX W PT 45 MINUTES: CPT

## 2019-05-15 PROCEDURE — 86480 TB TEST CELL IMMUN MEASURE: CPT

## 2019-05-15 PROCEDURE — 86360 T CELL ABSOLUTE COUNT/RATIO: CPT

## 2019-05-15 PROCEDURE — G0463: CPT | Mod: 25

## 2019-05-15 PROCEDURE — 84402 ASSAY OF FREE TESTOSTERONE: CPT

## 2019-05-15 PROCEDURE — 87536 HIV-1 QUANT&REVRSE TRNSCRPJ: CPT

## 2019-05-15 PROCEDURE — 87491 CHLMYD TRACH DNA AMP PROBE: CPT

## 2019-05-15 PROCEDURE — 80053 COMPREHEN METABOLIC PANEL: CPT

## 2019-05-16 LAB
HIV-1 VIRAL LOAD RESULT: SIGNIFICANT CHANGE UP
HIV1 RNA # SERPL NAA+PROBE: SIGNIFICANT CHANGE UP
HIV1 RNA SER-IMP: SIGNIFICANT CHANGE UP
HIV1 RNA SERPL NAA+PROBE-ACNC: SIGNIFICANT CHANGE UP
HIV1 RNA SERPL NAA+PROBE-LOG#: SIGNIFICANT CHANGE UP LG COP/ML

## 2019-05-17 LAB
TESTOST FLD-SCNC: 281.4 NG/DL — SIGNIFICANT CHANGE UP (ref 264–916)
TESTOSTERONE.FREE+WB SERPL-MCNC: 4.9 PG/ML — LOW (ref 6.8–21.5)

## 2019-05-20 NOTE — HISTORY OF PRESENT ILLNESS
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

## 2019-05-20 NOTE — REVIEW OF SYSTEMS
[Negative] : Heme/Lymph [FreeTextEntry3] : last eye exam - need to go [de-identified] : once  - when was with his mother in the hospital [FreeTextEntry4] : dental - went in February   sensitive in back of mouth on right

## 2019-05-20 NOTE — PHYSICAL EXAM
[General Appearance - In No Acute Distress] : in no acute distress [General Appearance - Alert] : alert [Sclera] : the sclera and conjunctiva were normal [PERRL With Normal Accommodation] : pupils were equal in size, round, reactive to light [Oropharynx] : the oropharynx was normal with no thrush [Extraocular Movements] : extraocular movements were intact [Outer Ear] : the ears and nose were normal in appearance [Neck Appearance] : the appearance of the neck was normal [Neck Cervical Mass (___cm)] : no neck mass was observed [Jugular Venous Distention Increased] : there was no jugular-venous distention [Thyroid Diffuse Enlargement] : the thyroid was not enlarged [Auscultation Breath Sounds / Voice Sounds] : lungs were clear to auscultation bilaterally [Heart Rate And Rhythm] : heart rate was normal and rhythm regular [Heart Sounds] : normal S1 and S2 [Heart Sounds Gallop] : no gallops [Murmurs] : no murmurs [Heart Sounds Pericardial Friction Rub] : no pericardial rub [Full Pulse] : the pedal pulses are present [Edema] : there was no peripheral edema [Bowel Sounds] : normal bowel sounds [Abdomen Soft] : soft [Abdomen Tenderness] : non-tender [Abdomen Mass (___ Cm)] : no abdominal mass palpated [Costovertebral Angle Tenderness] : no CVA tenderness [No Palpable Adenopathy] : no palpable adenopathy [Musculoskeletal - Swelling] : no joint swelling [Nail Clubbing] : no clubbing  or cyanosis of the fingernails [Motor Tone] : muscle strength and tone were normal [Skin Color & Pigmentation] : normal skin color and pigmentation [] : no rash [Sensation] : the sensory exam was normal to light touch and pinprick [Deep Tendon Reflexes (DTR)] : deep tendon reflexes were 2+ and symmetric [No Focal Deficits] : no focal deficits [Oriented To Time, Place, And Person] : oriented to person, place, and time [Affect] : the affect was normal

## 2019-05-20 NOTE — ASSESSMENT
[FreeTextEntry1] : 50 yo male with h/o HIV, lymphoma, VZV, remote h/o PCP, MRSA, here for routine check up\par \par saw dentist- needs all four wisdom teeth removed- needs to go back for sensitivity in mouth\par \par hasn't seen eye doctor- will refer to opthalmology\par shingles shot  - shingrix\par . Discussed exercise with patient. BP is not consistently elevated but concerning and needs to be followed. \par perhaps can change to just [employer], will review with my colleagues\par \par anal pap- when available- we should be offering by the summer\par \par  doing well\par optho - needs routine visit\par

## 2019-05-22 LAB
GAMMA INTERFERON BACKGROUND BLD IA-ACNC: 0.03 IU/ML — SIGNIFICANT CHANGE UP
M TB IFN-G BLD-IMP: NEGATIVE — SIGNIFICANT CHANGE UP
M TB IFN-G CD4+ BCKGRND COR BLD-ACNC: 0 IU/ML — SIGNIFICANT CHANGE UP
M TB IFN-G CD4+CD8+ BCKGRND COR BLD-ACNC: 0 IU/ML — SIGNIFICANT CHANGE UP
QUANT TB PLUS MITOGEN MINUS NIL: 1.62 IU/ML — SIGNIFICANT CHANGE UP

## 2019-05-24 DIAGNOSIS — Z85.71 PERSONAL HISTORY OF HODGKIN LYMPHOMA: ICD-10-CM

## 2019-05-24 DIAGNOSIS — R03.0 ELEVATED BLOOD-PRESSURE READING, WITHOUT DIAGNOSIS OF HYPERTENSION: ICD-10-CM

## 2019-05-28 LAB
HCV AB S/CO SERPL IA: 0.12 S/CO — SIGNIFICANT CHANGE UP (ref 0–0.99)
HCV AB SERPL-IMP: SIGNIFICANT CHANGE UP

## 2019-06-14 ENCOUNTER — OUTPATIENT (OUTPATIENT)
Dept: OUTPATIENT SERVICES | Facility: HOSPITAL | Age: 49
LOS: 1 days | End: 2019-06-14
Payer: MEDICARE

## 2019-06-14 DIAGNOSIS — K08.9 DISORDER OF TEETH AND SUPPORTING STRUCTURES, UNSPECIFIED: ICD-10-CM

## 2019-06-14 PROCEDURE — D0140: CPT

## 2019-06-25 DIAGNOSIS — Z01.20 ENCOUNTER FOR DENTAL EXAMINATION AND CLEANING WITHOUT ABNORMAL FINDINGS: ICD-10-CM

## 2019-07-10 ENCOUNTER — APPOINTMENT (OUTPATIENT)
Dept: INFECTIOUS DISEASE | Facility: CLINIC | Age: 49
End: 2019-07-10

## 2019-07-10 LAB
ALBUMIN SERPL ELPH-MCNC: 4.3 G/DL
ALP BLD-CCNC: 63 U/L
ALT SERPL-CCNC: 14 U/L
ANION GAP SERPL CALC-SCNC: 13 MMOL/L
AST SERPL-CCNC: 16 U/L
BASOPHILS # BLD AUTO: 0.04 K/UL
BASOPHILS NFR BLD AUTO: 0.8 %
BILIRUB SERPL-MCNC: 0.5 MG/DL
BUN SERPL-MCNC: 13 MG/DL
CALCIUM SERPL-MCNC: 9.1 MG/DL
CD3 CELLS # BLD: 970 /UL
CD3 CELLS NFR BLD: 67 %
CD3+CD4+ CELLS # BLD: 470 /UL
CD3+CD4+ CELLS NFR BLD: 32 %
CD3+CD4+ CELLS/CD3+CD8+ CLL SPEC: 1.01 RATIO
CD3+CD8+ CELLS # SPEC: 464 /UL
CD3+CD8+ CELLS NFR BLD: 32 %
CHLORIDE SERPL-SCNC: 104 MMOL/L
CO2 SERPL-SCNC: 24 MMOL/L
CREAT SERPL-MCNC: 1.07 MG/DL
EOSINOPHIL # BLD AUTO: 0.06 K/UL
EOSINOPHIL NFR BLD AUTO: 1.2 %
GLUCOSE SERPL-MCNC: 151 MG/DL
HCT VFR BLD CALC: 42 %
HGB BLD-MCNC: 13.9 G/DL
IMM GRANULOCYTES NFR BLD AUTO: 0.2 %
LYMPHOCYTES # BLD AUTO: 1.61 K/UL
LYMPHOCYTES NFR BLD AUTO: 32.3 %
MAN DIFF?: NORMAL
MCHC RBC-ENTMCNC: 30 PG
MCHC RBC-ENTMCNC: 33.1 GM/DL
MCV RBC AUTO: 90.7 FL
MONOCYTES # BLD AUTO: 0.26 K/UL
MONOCYTES NFR BLD AUTO: 5.2 %
NEUTROPHILS # BLD AUTO: 3 K/UL
NEUTROPHILS NFR BLD AUTO: 60.3 %
PLATELET # BLD AUTO: 160 K/UL
POTASSIUM SERPL-SCNC: 3.9 MMOL/L
PROT SERPL-MCNC: 6.7 G/DL
RBC # BLD: 4.63 M/UL
RBC # FLD: 12.9 %
SODIUM SERPL-SCNC: 141 MMOL/L
WBC # FLD AUTO: 4.98 K/UL

## 2019-07-11 LAB
HIV1 RNA # SERPL NAA+PROBE: ABNORMAL
HIV1 RNA # SERPL NAA+PROBE: ABNORMAL COPIES/ML
VIRAL LOAD INTERP: NORMAL
VIRAL LOAD LOG: ABNORMAL LG COP/ML

## 2019-08-14 ENCOUNTER — RX RENEWAL (OUTPATIENT)
Age: 49
End: 2019-08-14

## 2019-08-19 ENCOUNTER — APPOINTMENT (OUTPATIENT)
Dept: INFECTIOUS DISEASE | Facility: CLINIC | Age: 49
End: 2019-08-19
Payer: SELF-PAY

## 2019-08-19 DIAGNOSIS — Z71.89 OTHER SPECIFIED COUNSELING: ICD-10-CM

## 2019-08-19 PROCEDURE — 90690 TYPHOID VACCINE ORAL: CPT

## 2019-08-19 PROCEDURE — 90473 IMMUNE ADMIN ORAL/NASAL: CPT | Mod: NC

## 2019-08-19 PROCEDURE — 99401 PREV MED CNSL INDIV APPRX 15: CPT | Mod: 25

## 2019-08-28 ENCOUNTER — OUTPATIENT (OUTPATIENT)
Dept: OUTPATIENT SERVICES | Facility: HOSPITAL | Age: 49
LOS: 1 days | End: 2019-08-28
Payer: MEDICARE

## 2019-08-28 DIAGNOSIS — K08.9 DISORDER OF TEETH AND SUPPORTING STRUCTURES, UNSPECIFIED: ICD-10-CM

## 2019-08-28 PROCEDURE — D0120: CPT

## 2019-08-28 PROCEDURE — D1110: CPT

## 2019-09-04 DIAGNOSIS — Z01.20 ENCOUNTER FOR DENTAL EXAMINATION AND CLEANING WITHOUT ABNORMAL FINDINGS: ICD-10-CM

## 2019-11-13 ENCOUNTER — LABORATORY RESULT (OUTPATIENT)
Age: 49
End: 2019-11-13

## 2019-11-13 ENCOUNTER — APPOINTMENT (OUTPATIENT)
Dept: INFECTIOUS DISEASE | Facility: CLINIC | Age: 49
End: 2019-11-13
Payer: MEDICARE

## 2019-11-13 ENCOUNTER — OUTPATIENT (OUTPATIENT)
Dept: OUTPATIENT SERVICES | Facility: HOSPITAL | Age: 49
LOS: 1 days | End: 2019-11-13
Payer: MEDICARE

## 2019-11-13 VITALS
HEIGHT: 68 IN | DIASTOLIC BLOOD PRESSURE: 84 MMHG | OXYGEN SATURATION: 100 % | WEIGHT: 180 LBS | TEMPERATURE: 97 F | HEART RATE: 88 BPM | BODY MASS INDEX: 27.28 KG/M2 | SYSTOLIC BLOOD PRESSURE: 134 MMHG

## 2019-11-13 VITALS — DIASTOLIC BLOOD PRESSURE: 84 MMHG | SYSTOLIC BLOOD PRESSURE: 118 MMHG

## 2019-11-13 DIAGNOSIS — I10 ESSENTIAL (PRIMARY) HYPERTENSION: ICD-10-CM

## 2019-11-13 DIAGNOSIS — B20 HUMAN IMMUNODEFICIENCY VIRUS [HIV] DISEASE: ICD-10-CM

## 2019-11-13 DIAGNOSIS — M79.10 MYALGIA, UNSPECIFIED SITE: ICD-10-CM

## 2019-11-13 DIAGNOSIS — S00.429A: ICD-10-CM

## 2019-11-13 LAB
24R-OH-CALCIDIOL SERPL-MCNC: 44.3 NG/ML — SIGNIFICANT CHANGE UP (ref 30–80)
4/8 RATIO: 1 RATIO — SIGNIFICANT CHANGE UP (ref 0.9–3.6)
ABS CD8: 639 /UL — SIGNIFICANT CHANGE UP (ref 142–740)
ALBUMIN SERPL ELPH-MCNC: 4.6 G/DL — SIGNIFICANT CHANGE UP (ref 3.3–5)
ALP SERPL-CCNC: 81 U/L — SIGNIFICANT CHANGE UP (ref 40–120)
ALT FLD-CCNC: 15 U/L — SIGNIFICANT CHANGE UP (ref 10–45)
ANION GAP SERPL CALC-SCNC: 17 MMOL/L — SIGNIFICANT CHANGE UP (ref 5–17)
APPEARANCE UR: CLEAR — SIGNIFICANT CHANGE UP
AST SERPL-CCNC: 19 U/L — SIGNIFICANT CHANGE UP (ref 10–40)
BACTERIA # UR AUTO: NEGATIVE — SIGNIFICANT CHANGE UP
BILIRUB SERPL-MCNC: 0.3 MG/DL — SIGNIFICANT CHANGE UP (ref 0.2–1.2)
BILIRUB UR-MCNC: NEGATIVE — SIGNIFICANT CHANGE UP
BUN SERPL-MCNC: 14 MG/DL — SIGNIFICANT CHANGE UP (ref 7–23)
CALCIUM SERPL-MCNC: 9.4 MG/DL — SIGNIFICANT CHANGE UP (ref 8.4–10.5)
CD3 BLASTS SPEC-ACNC: 1325 /UL — SIGNIFICANT CHANGE UP (ref 672–1870)
CD3 BLASTS SPEC-ACNC: 66 % — SIGNIFICANT CHANGE UP (ref 59–83)
CD4 %: 32 % — SIGNIFICANT CHANGE UP (ref 30–62)
CD8 %: 32 % — SIGNIFICANT CHANGE UP (ref 12–36)
CHLORIDE SERPL-SCNC: 101 MMOL/L — SIGNIFICANT CHANGE UP (ref 96–108)
CHOLEST SERPL-MCNC: 179 MG/DL — SIGNIFICANT CHANGE UP (ref 10–199)
CK SERPL-CCNC: 84 U/L — SIGNIFICANT CHANGE UP (ref 30–200)
CO2 SERPL-SCNC: 23 MMOL/L — SIGNIFICANT CHANGE UP (ref 22–31)
COD CRY URNS QL: ABNORMAL
COLOR SPEC: YELLOW — SIGNIFICANT CHANGE UP
CREAT SERPL-MCNC: 1.1 MG/DL — SIGNIFICANT CHANGE UP (ref 0.5–1.3)
DIFF PNL FLD: NEGATIVE — SIGNIFICANT CHANGE UP
EPI CELLS # UR: 0 /HPF — SIGNIFICANT CHANGE UP (ref 0–5)
ESTIMATED AVERAGE GLUCOSE: 108 MG/DL — SIGNIFICANT CHANGE UP (ref 68–114)
GLUCOSE SERPL-MCNC: 99 MG/DL — SIGNIFICANT CHANGE UP (ref 70–99)
GLUCOSE UR QL: NEGATIVE — SIGNIFICANT CHANGE UP
HBA1C BLD-MCNC: 5.4 % — SIGNIFICANT CHANGE UP (ref 4–5.6)
HCT VFR BLD CALC: 44.8 % — SIGNIFICANT CHANGE UP (ref 39–50)
HCV AB S/CO SERPL IA: 0.28 S/CO — SIGNIFICANT CHANGE UP (ref 0–0.99)
HCV AB SERPL-IMP: SIGNIFICANT CHANGE UP
HDLC SERPL-MCNC: 43 MG/DL — SIGNIFICANT CHANGE UP
HGB BLD-MCNC: 14.7 G/DL — SIGNIFICANT CHANGE UP (ref 13–17)
HYALINE CASTS # UR AUTO: 0 /LPF — SIGNIFICANT CHANGE UP (ref 0–7)
KETONES UR-MCNC: NEGATIVE — SIGNIFICANT CHANGE UP
LEUKOCYTE ESTERASE UR-ACNC: NEGATIVE — SIGNIFICANT CHANGE UP
LIPID PNL WITH DIRECT LDL SERPL: 92 MG/DL — SIGNIFICANT CHANGE UP
MCHC RBC-ENTMCNC: 30.1 PG — SIGNIFICANT CHANGE UP (ref 27–34)
MCHC RBC-ENTMCNC: 32.8 GM/DL — SIGNIFICANT CHANGE UP (ref 32–36)
MCV RBC AUTO: 91.6 FL — SIGNIFICANT CHANGE UP (ref 80–100)
NITRITE UR-MCNC: NEGATIVE — SIGNIFICANT CHANGE UP
PH UR: 6 — SIGNIFICANT CHANGE UP (ref 5–8)
PLATELET # BLD AUTO: 256 K/UL — SIGNIFICANT CHANGE UP (ref 150–400)
POTASSIUM SERPL-MCNC: 3.7 MMOL/L — SIGNIFICANT CHANGE UP (ref 3.5–5.3)
POTASSIUM SERPL-SCNC: 3.7 MMOL/L — SIGNIFICANT CHANGE UP (ref 3.5–5.3)
PROT SERPL-MCNC: 7.5 G/DL — SIGNIFICANT CHANGE UP (ref 6–8.3)
PROT UR-MCNC: SIGNIFICANT CHANGE UP
RBC # BLD: 4.89 M/UL — SIGNIFICANT CHANGE UP (ref 4.2–5.8)
RBC # FLD: 12.6 % — SIGNIFICANT CHANGE UP (ref 10.3–14.5)
RBC CASTS # UR COMP ASSIST: 4 /HPF — SIGNIFICANT CHANGE UP (ref 0–4)
SODIUM SERPL-SCNC: 141 MMOL/L — SIGNIFICANT CHANGE UP (ref 135–145)
SP GR SPEC: 1.03 — HIGH (ref 1.01–1.02)
T PALLIDUM AB TITR SER: NEGATIVE — SIGNIFICANT CHANGE UP
T-CELL CD4 SUBSET PNL BLD: 640 /UL — SIGNIFICANT CHANGE UP (ref 489–1457)
TOTAL CHOLESTEROL/HDL RATIO MEASUREMENT: 4.2 RATIO — SIGNIFICANT CHANGE UP (ref 3.4–9.6)
TRIGL SERPL-MCNC: 221 MG/DL — HIGH (ref 10–149)
UROBILINOGEN FLD QL: SIGNIFICANT CHANGE UP
WBC # BLD: 6.31 K/UL — SIGNIFICANT CHANGE UP (ref 3.8–10.5)
WBC # FLD AUTO: 6.31 K/UL — SIGNIFICANT CHANGE UP (ref 3.8–10.5)
WBC UR QL: 1 /HPF — SIGNIFICANT CHANGE UP (ref 0–5)

## 2019-11-13 PROCEDURE — 90834 PSYTX W PT 45 MINUTES: CPT

## 2019-11-13 PROCEDURE — 99215 OFFICE O/P EST HI 40 MIN: CPT

## 2019-11-13 RX ORDER — AZITHROMYCIN 250 MG/1
250 TABLET, FILM COATED ORAL
Qty: 4 | Refills: 0 | Status: COMPLETED | COMMUNITY
Start: 2019-08-19 | End: 2019-11-13

## 2019-11-13 NOTE — PHYSICAL EXAM
[General Appearance - Alert] : alert [PERRL With Normal Accommodation] : pupils were equal in size, round, reactive to light [General Appearance - In No Acute Distress] : in no acute distress [Sclera] : the sclera and conjunctiva were normal [Outer Ear] : the ears and nose were normal in appearance [Extraocular Movements] : extraocular movements were intact [Oropharynx] : the oropharynx was normal with no thrush [Neck Appearance] : the appearance of the neck was normal [Neck Cervical Mass (___cm)] : no neck mass was observed [Thyroid Diffuse Enlargement] : the thyroid was not enlarged [Jugular Venous Distention Increased] : there was no jugular-venous distention [Auscultation Breath Sounds / Voice Sounds] : lungs were clear to auscultation bilaterally [Heart Rate And Rhythm] : heart rate was normal and rhythm regular [Heart Sounds Gallop] : no gallops [Heart Sounds] : normal S1 and S2 [Murmurs] : no murmurs [Heart Sounds Pericardial Friction Rub] : no pericardial rub [Full Pulse] : the pedal pulses are present [Edema] : there was no peripheral edema [Abdomen Soft] : soft [Bowel Sounds] : normal bowel sounds [Abdomen Tenderness] : non-tender [Costovertebral Angle Tenderness] : no CVA tenderness [Abdomen Mass (___ Cm)] : no abdominal mass palpated [Musculoskeletal - Swelling] : no joint swelling [No Palpable Adenopathy] : no palpable adenopathy [Motor Tone] : muscle strength and tone were normal [Nail Clubbing] : no clubbing  or cyanosis of the fingernails [Skin Color & Pigmentation] : normal skin color and pigmentation [Deep Tendon Reflexes (DTR)] : deep tendon reflexes were 2+ and symmetric [] : no rash [No Focal Deficits] : no focal deficits [Sensation] : the sensory exam was normal to light touch and pinprick [Oriented To Time, Place, And Person] : oriented to person, place, and time [Affect] : the affect was normal

## 2019-11-14 LAB
C TRACH RRNA SPEC QL NAA+PROBE: SIGNIFICANT CHANGE UP
CULTURE RESULTS: SIGNIFICANT CHANGE UP
HIV-1 VIRAL LOAD RESULT: SIGNIFICANT CHANGE UP
HIV1 RNA # SERPL NAA+PROBE: SIGNIFICANT CHANGE UP
HIV1 RNA SER-IMP: SIGNIFICANT CHANGE UP
HIV1 RNA SERPL NAA+PROBE-ACNC: SIGNIFICANT CHANGE UP
HIV1 RNA SERPL NAA+PROBE-LOG#: SIGNIFICANT CHANGE UP LG COP/ML
N GONORRHOEA RRNA SPEC QL NAA+PROBE: SIGNIFICANT CHANGE UP
SPECIMEN SOURCE: SIGNIFICANT CHANGE UP
SPECIMEN SOURCE: SIGNIFICANT CHANGE UP

## 2019-11-21 ENCOUNTER — RX RENEWAL (OUTPATIENT)
Age: 49
End: 2019-11-21

## 2019-11-22 ENCOUNTER — APPOINTMENT (OUTPATIENT)
Dept: INFECTIOUS DISEASE | Facility: CLINIC | Age: 49
End: 2019-11-22

## 2019-11-22 ENCOUNTER — LABORATORY RESULT (OUTPATIENT)
Age: 49
End: 2019-11-22

## 2019-11-22 VITALS
DIASTOLIC BLOOD PRESSURE: 81 MMHG | SYSTOLIC BLOOD PRESSURE: 126 MMHG | TEMPERATURE: 97.8 F | HEIGHT: 68 IN | BODY MASS INDEX: 27.13 KG/M2 | OXYGEN SATURATION: 98 % | WEIGHT: 179 LBS

## 2019-11-22 LAB
CHOLEST SERPL-MCNC: 176 MG/DL — SIGNIFICANT CHANGE UP (ref 10–199)
HDLC SERPL-MCNC: 43 MG/DL — SIGNIFICANT CHANGE UP
LIPID PNL WITH DIRECT LDL SERPL: 112 MG/DL — HIGH
TOTAL CHOLESTEROL/HDL RATIO MEASUREMENT: 4.1 RATIO — SIGNIFICANT CHANGE UP (ref 3.4–9.6)
TRIGL SERPL-MCNC: 107 MG/DL — SIGNIFICANT CHANGE UP (ref 10–149)

## 2019-11-22 PROCEDURE — 82306 VITAMIN D 25 HYDROXY: CPT

## 2019-11-22 PROCEDURE — 83036 HEMOGLOBIN GLYCOSYLATED A1C: CPT

## 2019-11-22 PROCEDURE — 82550 ASSAY OF CK (CPK): CPT

## 2019-11-22 PROCEDURE — G0463: CPT

## 2019-11-22 PROCEDURE — 81001 URINALYSIS AUTO W/SCOPE: CPT

## 2019-11-22 PROCEDURE — 86803 HEPATITIS C AB TEST: CPT

## 2019-11-22 PROCEDURE — 80061 LIPID PANEL: CPT

## 2019-11-22 PROCEDURE — 86780 TREPONEMA PALLIDUM: CPT

## 2019-11-22 PROCEDURE — 86360 T CELL ABSOLUTE COUNT/RATIO: CPT

## 2019-11-22 PROCEDURE — 87536 HIV-1 QUANT&REVRSE TRNSCRPJ: CPT

## 2019-11-22 PROCEDURE — 87591 N.GONORRHOEAE DNA AMP PROB: CPT

## 2019-11-22 PROCEDURE — 85027 COMPLETE CBC AUTOMATED: CPT

## 2019-11-22 PROCEDURE — 87491 CHLMYD TRACH DNA AMP PROBE: CPT

## 2019-11-22 PROCEDURE — 90834 PSYTX W PT 45 MINUTES: CPT

## 2019-11-22 PROCEDURE — 87086 URINE CULTURE/COLONY COUNT: CPT

## 2019-11-22 PROCEDURE — 80053 COMPREHEN METABOLIC PANEL: CPT

## 2019-11-23 NOTE — ASSESSMENT
[FreeTextEntry1] : 50 yo male with h/o HIV, lymphoma, VZV, remote h/o PCP, MRSA, here for routine check up\par \par saw dentist- needs all four wisdom teeth removed- needs to go back for sensitivity in mouth- has appointment December 2nd\par \par hasn't seen eye doctor- will refer to opthalmology\par shingles shot - shingrix- will come back in a few weeks for it\par . Discussed exercise with patient. BP is not consistently elevated but concerning and needs to be followed. \par anal pap- when available- \par  doing well\par optho - needs routine visit\par needs flu shot\par \par needs colonoscopy at 50\par \par will come in for evaluation if gets lesion on ear again - sounds like HSV or VZV\par BP cuff at home\par \par rheumatologist or orthopedist  if pain persists\par hold off on weights\par \par in a month check BP and give shingles shot\par . \par

## 2019-11-23 NOTE — ASSESSMENT
[FreeTextEntry1] : 50 yo male with h/o HIV, lymphoma here with recurrent blister of left ear. I suspect this is either HSV or VZV but could this be something else? pemphigous, papillomavirus ? other?\par NOthing to culture currently\par will refer to dermatology\par trial of valtrex if worsens- this is not a new phenomenon for the patient  no erythema around lesion, single lesion, not quite typical. certainly not a crop of lesions

## 2019-11-23 NOTE — HISTORY OF PRESENT ILLNESS
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

## 2019-11-23 NOTE — HISTORY OF PRESENT ILLNESS
[FreeTextEntry1] : Patient describes sporadic episodes of sharp pain on the left of his head with bubbling up in the left ear lobe on the upper portion\par Pt was here today to check fasting lipids and noted that this had occurred this Wednesday\par Had no pain but had the lesions\par NO fevers or chills \par \par Pt had shingles shot last week

## 2019-11-23 NOTE — REVIEW OF SYSTEMS
[Negative] : Psychiatric [Penile Discharge] : no penile discharge [FreeTextEntry2] : fevers resolved that Sunday   no back pain [FreeTextEntry3] : no recent eye exam [FreeTextEntry8] : no penile discharger  no ulcers or lesions no nocturia [FreeTextEntry4] : last dentist August [FreeTextEntry9] : muscle pain [de-identified] : has occasional blister on ear that hurts

## 2019-11-23 NOTE — PHYSICAL EXAM
[General Appearance - In No Acute Distress] : in no acute distress [Sclera] : the sclera and conjunctiva were normal [General Appearance - Alert] : alert [PERRL With Normal Accommodation] : pupils were equal in size, round, reactive to light [Extraocular Movements] : extraocular movements were intact [Neck Cervical Mass (___cm)] : no neck mass was observed [Neck Appearance] : the appearance of the neck was normal [Jugular Venous Distention Increased] : there was no jugular-venous distention [Thyroid Diffuse Enlargement] : the thyroid was not enlarged [Heart Rate And Rhythm] : heart rate was normal and rhythm regular [Auscultation Breath Sounds / Voice Sounds] : lungs were clear to auscultation bilaterally [Heart Sounds Gallop] : no gallops [Heart Sounds Pericardial Friction Rub] : no pericardial rub [Murmurs] : no murmurs [Heart Sounds] : normal S1 and S2 [Edema] : there was no peripheral edema [Full Pulse] : the pedal pulses are present [Abdomen Soft] : soft [Bowel Sounds] : normal bowel sounds [] : no hepato-splenomegaly [Abdomen Tenderness] : non-tender [Costovertebral Angle Tenderness] : no CVA tenderness [Abdomen Mass (___ Cm)] : no abdominal mass palpated [No Palpable Adenopathy] : no palpable adenopathy [Musculoskeletal - Swelling] : no joint swelling [Nail Clubbing] : no clubbing  or cyanosis of the fingernails [FreeTextEntry1] : flat bullae left ear [Deep Tendon Reflexes (DTR)] : deep tendon reflexes were 2+ and symmetric [Motor Tone] : muscle strength and tone were normal [Sensation] : the sensory exam was normal to light touch and pinprick [No Focal Deficits] : no focal deficits [Oriented To Time, Place, And Person] : oriented to person, place, and time [Affect] : the affect was normal

## 2020-03-04 ENCOUNTER — OUTPATIENT (OUTPATIENT)
Dept: OUTPATIENT SERVICES | Facility: HOSPITAL | Age: 50
LOS: 1 days | End: 2020-03-04
Payer: MEDICARE

## 2020-03-04 DIAGNOSIS — K08.9 DISORDER OF TEETH AND SUPPORTING STRUCTURES, UNSPECIFIED: ICD-10-CM

## 2020-03-04 PROCEDURE — D0120: CPT

## 2020-03-04 PROCEDURE — D1110: CPT

## 2020-03-04 PROCEDURE — D0274: CPT

## 2020-03-10 DIAGNOSIS — Z01.20 ENCOUNTER FOR DENTAL EXAMINATION AND CLEANING WITHOUT ABNORMAL FINDINGS: ICD-10-CM

## 2020-04-02 ENCOUNTER — RX RENEWAL (OUTPATIENT)
Age: 50
End: 2020-04-02

## 2020-05-18 ENCOUNTER — FORM ENCOUNTER (OUTPATIENT)
Age: 50
End: 2020-05-18

## 2020-05-19 ENCOUNTER — OUTPATIENT (OUTPATIENT)
Dept: OUTPATIENT SERVICES | Facility: HOSPITAL | Age: 50
LOS: 1 days | End: 2020-05-19
Payer: MEDICARE

## 2020-05-19 ENCOUNTER — APPOINTMENT (OUTPATIENT)
Dept: INFECTIOUS DISEASE | Facility: CLINIC | Age: 50
End: 2020-05-19
Payer: MEDICARE

## 2020-05-19 DIAGNOSIS — Z86.14 PERSONAL HISTORY OF METHICILLIN RESISTANT STAPHYLOCOCCUS AUREUS INFECTION: ICD-10-CM

## 2020-05-19 DIAGNOSIS — B20 HUMAN IMMUNODEFICIENCY VIRUS [HIV] DISEASE: ICD-10-CM

## 2020-05-19 DIAGNOSIS — Z86.19 PERSONAL HISTORY OF OTHER INFECTIOUS AND PARASITIC DISEASES: ICD-10-CM

## 2020-05-19 DIAGNOSIS — Z85.72 PERSONAL HISTORY OF NON-HODGKIN LYMPHOMAS: ICD-10-CM

## 2020-05-19 PROCEDURE — G0463: CPT

## 2020-05-19 PROCEDURE — 99214 OFFICE O/P EST MOD 30 MIN: CPT | Mod: 95

## 2020-05-19 RX ORDER — ADHESIVE TAPE 3"X 2.3 YD
50 MCG TAPE, NON-MEDICATED TOPICAL
Refills: 0 | Status: ACTIVE | COMMUNITY

## 2020-05-19 NOTE — ASSESSMENT
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

## 2020-05-19 NOTE — HISTORY OF PRESENT ILLNESS
[Home] : at home, [unfilled] , at the time of the visit. [Medical Office: ([facility])___] : at the medical office located in  [Patient] : the patient [Self] : self [FreeTextEntry1] : \par 49 yo male with h/o HIV, VZV, PCP, MRSA, lymphoma here with recurrent blister of left ear. I suspect this is either HSV or VZV but could this be something else? pemphigous, papillomavirus ? other?\par NOthing to culture currently\par will refer to dermatology\par trial of valtrex if worsens- this is not a new phenomenon for the patient no erythema around lesion, single lesion, not quite typical. certainly not a crop of lesions. This lesion has not recurred so patient has not gone to the dermatologist\par \par  [de-identified] : none  [facility] jim paula nance  [de-identified] : at home  [de-identified] :   [de-identified] :

## 2020-05-19 NOTE — REVIEW OF SYSTEMS
[Negative] : Psychiatric [FreeTextEntry2] : eating healthy  trying to exercise  hiked this weekend [FreeTextEntry3] : last eye exam unsure - due for that [FreeTextEntry4] : dentist goes every 6 months- sept 16th [de-identified] : none

## 2020-05-21 DIAGNOSIS — Z86.19 PERSONAL HISTORY OF OTHER INFECTIOUS AND PARASITIC DISEASES: ICD-10-CM

## 2020-05-21 DIAGNOSIS — S00.442A: ICD-10-CM

## 2020-05-21 DIAGNOSIS — Z85.72 PERSONAL HISTORY OF NON-HODGKIN LYMPHOMAS: ICD-10-CM

## 2020-05-21 DIAGNOSIS — Z86.14 PERSONAL HISTORY OF METHICILLIN RESISTANT STAPHYLOCOCCUS AUREUS INFECTION: ICD-10-CM

## 2020-05-21 DIAGNOSIS — Z85.71 PERSONAL HISTORY OF HODGKIN LYMPHOMA: ICD-10-CM

## 2020-05-27 ENCOUNTER — LABORATORY RESULT (OUTPATIENT)
Age: 50
End: 2020-05-27

## 2020-05-27 ENCOUNTER — APPOINTMENT (OUTPATIENT)
Dept: INFECTIOUS DISEASE | Facility: CLINIC | Age: 50
End: 2020-05-27

## 2020-05-27 LAB
24R-OH-CALCIDIOL SERPL-MCNC: 42.4 NG/ML — SIGNIFICANT CHANGE UP (ref 30–80)
4/8 RATIO: 1.12 RATIO — SIGNIFICANT CHANGE UP (ref 0.9–3.6)
A1C WITH ESTIMATED AVERAGE GLUCOSE RESULT: 5.3 % — SIGNIFICANT CHANGE UP (ref 4–5.6)
ABS CD8: 555 /UL — SIGNIFICANT CHANGE UP (ref 142–740)
ALBUMIN SERPL ELPH-MCNC: 4.2 G/DL — SIGNIFICANT CHANGE UP (ref 3.3–5)
ALP SERPL-CCNC: 65 U/L — SIGNIFICANT CHANGE UP (ref 40–120)
ALT FLD-CCNC: 16 U/L — SIGNIFICANT CHANGE UP (ref 10–45)
ANION GAP SERPL CALC-SCNC: 11 MMOL/L — SIGNIFICANT CHANGE UP (ref 5–17)
APPEARANCE UR: CLEAR — SIGNIFICANT CHANGE UP
AST SERPL-CCNC: 21 U/L — SIGNIFICANT CHANGE UP (ref 10–40)
BILIRUB SERPL-MCNC: 0.7 MG/DL — SIGNIFICANT CHANGE UP (ref 0.2–1.2)
BILIRUB UR-MCNC: NEGATIVE — SIGNIFICANT CHANGE UP
BUN SERPL-MCNC: 10 MG/DL — SIGNIFICANT CHANGE UP (ref 7–23)
CALCIUM SERPL-MCNC: 9.1 MG/DL — SIGNIFICANT CHANGE UP (ref 8.4–10.5)
CD3 BLASTS SPEC-ACNC: 1211 /UL — SIGNIFICANT CHANGE UP (ref 672–1870)
CD3 BLASTS SPEC-ACNC: 68 % — SIGNIFICANT CHANGE UP (ref 59–83)
CD4 %: 35 % — SIGNIFICANT CHANGE UP (ref 30–62)
CD8 %: 31 % — SIGNIFICANT CHANGE UP (ref 12–36)
CHLORIDE SERPL-SCNC: 104 MMOL/L — SIGNIFICANT CHANGE UP (ref 96–108)
CHOLEST SERPL-MCNC: 189 MG/DL — SIGNIFICANT CHANGE UP (ref 10–199)
CO2 SERPL-SCNC: 27 MMOL/L — SIGNIFICANT CHANGE UP (ref 22–31)
COLOR SPEC: YELLOW — SIGNIFICANT CHANGE UP
CREAT SERPL-MCNC: 1.06 MG/DL — SIGNIFICANT CHANGE UP (ref 0.5–1.3)
DIFF PNL FLD: NEGATIVE — SIGNIFICANT CHANGE UP
ESTIMATED AVERAGE GLUCOSE: 105 MG/DL — SIGNIFICANT CHANGE UP (ref 68–114)
GLUCOSE SERPL-MCNC: 114 MG/DL — HIGH (ref 70–99)
GLUCOSE UR QL: NEGATIVE — SIGNIFICANT CHANGE UP
HCT VFR BLD CALC: 42.8 % — SIGNIFICANT CHANGE UP (ref 39–50)
HCV AB S/CO SERPL IA: 0.31 S/CO — SIGNIFICANT CHANGE UP (ref 0–0.99)
HCV AB SERPL-IMP: SIGNIFICANT CHANGE UP
HDLC SERPL-MCNC: 37 MG/DL — LOW
HGB BLD-MCNC: 13.9 G/DL — SIGNIFICANT CHANGE UP (ref 13–17)
KETONES UR-MCNC: NEGATIVE — SIGNIFICANT CHANGE UP
LEUKOCYTE ESTERASE UR-ACNC: NEGATIVE — SIGNIFICANT CHANGE UP
LIPID PNL WITH DIRECT LDL SERPL: 112 MG/DL — HIGH
MCHC RBC-ENTMCNC: 30.3 PG — SIGNIFICANT CHANGE UP (ref 27–34)
MCHC RBC-ENTMCNC: 32.5 GM/DL — SIGNIFICANT CHANGE UP (ref 32–36)
MCV RBC AUTO: 93.4 FL — SIGNIFICANT CHANGE UP (ref 80–100)
NITRITE UR-MCNC: NEGATIVE — SIGNIFICANT CHANGE UP
PH UR: 6.5 — SIGNIFICANT CHANGE UP (ref 5–8)
PLATELET # BLD AUTO: 185 K/UL — SIGNIFICANT CHANGE UP (ref 150–400)
POTASSIUM SERPL-MCNC: 3.9 MMOL/L — SIGNIFICANT CHANGE UP (ref 3.5–5.3)
POTASSIUM SERPL-SCNC: 3.9 MMOL/L — SIGNIFICANT CHANGE UP (ref 3.5–5.3)
PROT SERPL-MCNC: 6.9 G/DL — SIGNIFICANT CHANGE UP (ref 6–8.3)
PROT UR-MCNC: SIGNIFICANT CHANGE UP
RBC # BLD: 4.58 M/UL — SIGNIFICANT CHANGE UP (ref 4.2–5.8)
RBC # FLD: 13 % — SIGNIFICANT CHANGE UP (ref 10.3–14.5)
SARS-COV-2 IGG SERPL QL IA: NEGATIVE — SIGNIFICANT CHANGE UP
SARS-COV-2 IGM SERPL IA-ACNC: 0.1 INDEX — SIGNIFICANT CHANGE UP
SODIUM SERPL-SCNC: 142 MMOL/L — SIGNIFICANT CHANGE UP (ref 135–145)
SP GR SPEC: 1.03 — HIGH (ref 1.01–1.02)
T PALLIDUM AB TITR SER: NEGATIVE — SIGNIFICANT CHANGE UP
T-CELL CD4 SUBSET PNL BLD: 623 /UL — SIGNIFICANT CHANGE UP (ref 489–1457)
TOTAL CHOLESTEROL/HDL RATIO MEASUREMENT: 5.1 RATIO — SIGNIFICANT CHANGE UP (ref 3.4–9.6)
TRIGL SERPL-MCNC: 202 MG/DL — HIGH (ref 10–149)
UROBILINOGEN FLD QL: SIGNIFICANT CHANGE UP
WBC # BLD: 4.71 K/UL — SIGNIFICANT CHANGE UP (ref 3.8–10.5)
WBC # FLD AUTO: 4.71 K/UL — SIGNIFICANT CHANGE UP (ref 3.8–10.5)

## 2020-05-28 LAB
BACTERIA # UR AUTO: NEGATIVE — SIGNIFICANT CHANGE UP
C TRACH RRNA SPEC QL NAA+PROBE: SIGNIFICANT CHANGE UP
COD CRY URNS QL: ABNORMAL
EPI CELLS # UR: 0 /HPF — SIGNIFICANT CHANGE UP (ref 0–5)
GAMMA INTERFERON BACKGROUND BLD IA-ACNC: 0.01 IU/ML — SIGNIFICANT CHANGE UP
HIV-1 VIRAL LOAD RESULT: SIGNIFICANT CHANGE UP
HIV1 RNA # SERPL NAA+PROBE: SIGNIFICANT CHANGE UP
HIV1 RNA SER-IMP: SIGNIFICANT CHANGE UP
HIV1 RNA SERPL NAA+PROBE-ACNC: SIGNIFICANT CHANGE UP
HIV1 RNA SERPL NAA+PROBE-LOG#: SIGNIFICANT CHANGE UP LG COP/ML
HYALINE CASTS # UR AUTO: 1 /LPF — SIGNIFICANT CHANGE UP (ref 0–7)
M TB IFN-G BLD-IMP: NEGATIVE — SIGNIFICANT CHANGE UP
M TB IFN-G CD4+ BCKGRND COR BLD-ACNC: 0 IU/ML — SIGNIFICANT CHANGE UP
M TB IFN-G CD4+CD8+ BCKGRND COR BLD-ACNC: 0 IU/ML — SIGNIFICANT CHANGE UP
N GONORRHOEA RRNA SPEC QL NAA+PROBE: SIGNIFICANT CHANGE UP
QUANT TB PLUS MITOGEN MINUS NIL: 7.51 IU/ML — SIGNIFICANT CHANGE UP
RBC CASTS # UR COMP ASSIST: 1 /HPF — SIGNIFICANT CHANGE UP (ref 0–4)
SPECIMEN SOURCE: SIGNIFICANT CHANGE UP
WBC UR QL: 1 /HPF — SIGNIFICANT CHANGE UP (ref 0–5)

## 2020-08-06 ENCOUNTER — RX RENEWAL (OUTPATIENT)
Age: 50
End: 2020-08-06

## 2020-09-16 ENCOUNTER — OUTPATIENT (OUTPATIENT)
Dept: OUTPATIENT SERVICES | Facility: HOSPITAL | Age: 50
LOS: 1 days | End: 2020-09-16
Payer: MEDICARE

## 2020-09-16 DIAGNOSIS — K08.9 DISORDER OF TEETH AND SUPPORTING STRUCTURES, UNSPECIFIED: ICD-10-CM

## 2020-09-16 PROCEDURE — D1110: CPT

## 2020-09-16 PROCEDURE — D0120: CPT

## 2020-09-17 DIAGNOSIS — Z01.20 ENCOUNTER FOR DENTAL EXAMINATION AND CLEANING WITHOUT ABNORMAL FINDINGS: ICD-10-CM

## 2020-10-01 ENCOUNTER — APPOINTMENT (OUTPATIENT)
Dept: ORTHOPEDIC SURGERY | Facility: CLINIC | Age: 50
End: 2020-10-01
Payer: MEDICARE

## 2020-10-01 VITALS
HEIGHT: 68 IN | SYSTOLIC BLOOD PRESSURE: 148 MMHG | HEART RATE: 86 BPM | DIASTOLIC BLOOD PRESSURE: 91 MMHG | BODY MASS INDEX: 27.13 KG/M2 | WEIGHT: 179 LBS

## 2020-10-01 DIAGNOSIS — M25.511 PAIN IN RIGHT SHOULDER: ICD-10-CM

## 2020-10-01 PROCEDURE — 73030 X-RAY EXAM OF SHOULDER: CPT | Mod: RT

## 2020-10-01 PROCEDURE — 99203 OFFICE O/P NEW LOW 30 MIN: CPT | Mod: 25

## 2020-10-01 PROCEDURE — 20610 DRAIN/INJ JOINT/BURSA W/O US: CPT | Mod: RT

## 2020-10-01 NOTE — PROCEDURE
[de-identified] : Injection: Right shoulder (Subacromial).\par Indication: Impingement.\par \par A discussion was had with the patient regarding this procedure and all questions were answered. All risks, benefits and alternatives were discussed. These included but were not limited to bleeding, infection, and allergic reaction. Alcohol was used to clean the skin, and betadine was used to sterilize and prep the area in the posterior aspect of the right shoulder. Ethyl chloride spray was then used as a topical anesthetic. A 21-gauge needle was used to inject 4cc of 1% lidocaine and 1cc of 40mg/ml methylprednisolone into the right subacromial space. A sterile bandage was then applied. The patient tolerated the procedure well and there were no complications.

## 2020-10-01 NOTE — DISCUSSION/SUMMARY
[de-identified] : 50-year-old male with right shoulder pain\par \par A discussion was had with the patient regarding potential sources of inflammatory shoulder discomfort; including rotator cuff tendon dysfunction (including tendonitis vs. internal structural damage), subdeltoid/subacromial bursitis, or impingement of the rotator cuff at the acromion. Other contributing sources of pain may be the acromioclavicular joint or long head of the biceps tendon. Irritation is typically caused either by overhead repetitive activity or as a result of minor injury.\par \par Discussed short-term and long-term outcomes as well as the goal of treatment to reduce pain and restore function. Nonsurgical treatment is typically first-line therapy that may take weeks to months to resolve symptoms; includes rest from overhead activities, NSAIDs, home exercise program versus physical therapy to restore normal strength/ROM/function of the shoulder, and possible corticosteroid injection. Also discussed role of arthroscopic surgical intervention when nonsurgical treatment does not adequately relieve pain/inflammation.\par \par Recommendations: Conservative modalities as above (overhead activity rest/activity avoidance until less symptommatic, ice, NSAIDs, home exercise strengthening and stretching program). \par \par Injection therapy performed to the subacromial space today for therapeutic and diagnostic purposes. \par \par Followup: As needed

## 2020-10-01 NOTE — PHYSICAL EXAM
[de-identified] : Oriented to time, place, person\par Mood: Normal\par Affect: Normal\par Appearance: Healthy, well appearing, no acute distress.\par Gait: Normal\par Assistive Devices: None\par \par Right shoulder exam\par \par Inspection: No malalignment, No defects, No atrophy\par Skin: No masses, No lesions\par Neck: Negative Spurlings, full ROM, no pain with ROM\par AROM: FF to 180, abduction to 90, ER to 70, IR to mid lumbar\par PROM: Full\par Painful arc ROM: Mild with internal rotation\par Tenderness: No bicipital tenderness, minimal tenderness to the greater tuberosity/RTC insertion, no anterior shoulder/lesser tuberosity tenderness\par Strength: 5/5 ER, 5/5 IR in adduction, 5/5 supraspinatus testing, positive O'Briens test\par AC Joint: No ttp/pain with cross arm testing\par Biceps: Speed Negative, Yergusons Negative\par Impingement test: Positive Shahid, Positive Neer \par Stability: Stable\par Vasc: 2+ radial pulse\par Neuro: AIN, PIN, Ulnar nerve in tact to motor\par Sensation: In tact to light touch throughout\par  [de-identified] : \par The following radiographs were ordered and read by me during this patients visit. I reviewed each radiograph in detail with the patient and discussed the findings as highlighted below. \par \par 3 views of the right shoulder were obtained today that show no acute fracture or dislocation. There is no glenohumeral and no AC joint degenerative change seen. Type I acromion. There is no significant malalignment. No significant other obvious osseous abnormality, otherwise unremarkable.

## 2020-10-01 NOTE — HISTORY OF PRESENT ILLNESS
[de-identified] : 50 year old RHD male presents today with right shoulder pain x 2 months. No injury reported. The pain is intermittent brought on with internal rotation, and laying on the arm. He is not taking pain medication. Denies numbness or tingling.  States that he has similar pain in the left shoulder pain last year for which he received two cortisone injections.  The first injection did not provide any significant relief, but the second injection relieved his pain.  Currently not working.\par \par The patient's past medical history, past surgical history, medications and allergies were reviewed by me today with the patient and documented accordingly. In addition, the patient's family and social history, which were noncontributory to this visit, were reviewed also.

## 2020-11-18 ENCOUNTER — FORM ENCOUNTER (OUTPATIENT)
Age: 50
End: 2020-11-18

## 2020-11-19 ENCOUNTER — LABORATORY RESULT (OUTPATIENT)
Age: 50
End: 2020-11-19

## 2020-11-19 ENCOUNTER — OUTPATIENT (OUTPATIENT)
Dept: OUTPATIENT SERVICES | Facility: HOSPITAL | Age: 50
LOS: 1 days | End: 2020-11-19
Payer: MEDICARE

## 2020-11-19 ENCOUNTER — APPOINTMENT (OUTPATIENT)
Dept: INFECTIOUS DISEASE | Facility: CLINIC | Age: 50
End: 2020-11-19
Payer: MEDICARE

## 2020-11-19 ENCOUNTER — APPOINTMENT (OUTPATIENT)
Dept: INFECTIOUS DISEASE | Facility: CLINIC | Age: 50
End: 2020-11-19

## 2020-11-19 VITALS
HEART RATE: 89 BPM | TEMPERATURE: 99 F | RESPIRATION RATE: 16 BRPM | DIASTOLIC BLOOD PRESSURE: 89 MMHG | SYSTOLIC BLOOD PRESSURE: 130 MMHG | BODY MASS INDEX: 27.58 KG/M2 | WEIGHT: 182 LBS | HEIGHT: 68 IN | OXYGEN SATURATION: 97 %

## 2020-11-19 DIAGNOSIS — B01.9 ZOSTER W/OUT COMPLICATIONS: ICD-10-CM

## 2020-11-19 DIAGNOSIS — B02.9 ZOSTER W/OUT COMPLICATIONS: ICD-10-CM

## 2020-11-19 DIAGNOSIS — B20 HUMAN IMMUNODEFICIENCY VIRUS [HIV] DISEASE: ICD-10-CM

## 2020-11-19 PROCEDURE — G0463: CPT | Mod: 25

## 2020-11-19 PROCEDURE — 86780 TREPONEMA PALLIDUM: CPT

## 2020-11-19 PROCEDURE — 86803 HEPATITIS C AB TEST: CPT

## 2020-11-19 PROCEDURE — 86480 TB TEST CELL IMMUN MEASURE: CPT

## 2020-11-19 PROCEDURE — G0008: CPT

## 2020-11-19 PROCEDURE — 80053 COMPREHEN METABOLIC PANEL: CPT

## 2020-11-19 PROCEDURE — 83036 HEMOGLOBIN GLYCOSYLATED A1C: CPT

## 2020-11-19 PROCEDURE — 99215 OFFICE O/P EST HI 40 MIN: CPT

## 2020-11-19 PROCEDURE — 82306 VITAMIN D 25 HYDROXY: CPT

## 2020-11-19 PROCEDURE — 87491 CHLMYD TRACH DNA AMP PROBE: CPT

## 2020-11-19 PROCEDURE — 84153 ASSAY OF PSA TOTAL: CPT

## 2020-11-19 PROCEDURE — 90688 IIV4 VACCINE SPLT 0.5 ML IM: CPT

## 2020-11-19 PROCEDURE — 81001 URINALYSIS AUTO W/SCOPE: CPT

## 2020-11-19 PROCEDURE — 86360 T CELL ABSOLUTE COUNT/RATIO: CPT

## 2020-11-19 PROCEDURE — 86769 SARS-COV-2 COVID-19 ANTIBODY: CPT

## 2020-11-19 PROCEDURE — 87536 HIV-1 QUANT&REVRSE TRNSCRPJ: CPT

## 2020-11-19 PROCEDURE — 80061 LIPID PANEL: CPT

## 2020-11-19 PROCEDURE — 85027 COMPLETE CBC AUTOMATED: CPT

## 2020-11-19 PROCEDURE — 86359 T CELLS TOTAL COUNT: CPT

## 2020-11-19 PROCEDURE — 87591 N.GONORRHOEAE DNA AMP PROB: CPT

## 2020-11-20 LAB
24R-OH-CALCIDIOL SERPL-MCNC: 54.5 NG/ML — SIGNIFICANT CHANGE UP (ref 30–80)
4/8 RATIO: 1.11 RATIO — SIGNIFICANT CHANGE UP (ref 0.9–3.6)
A1C WITH ESTIMATED AVERAGE GLUCOSE RESULT: 5.4 % — SIGNIFICANT CHANGE UP (ref 4–5.6)
ABS CD8: 473 /UL — SIGNIFICANT CHANGE UP (ref 142–740)
ALBUMIN SERPL ELPH-MCNC: 4.5 G/DL — SIGNIFICANT CHANGE UP (ref 3.3–5)
ALP SERPL-CCNC: 80 U/L — SIGNIFICANT CHANGE UP (ref 40–120)
ALT FLD-CCNC: 16 U/L — SIGNIFICANT CHANGE UP (ref 10–45)
ANION GAP SERPL CALC-SCNC: 12 MMOL/L — SIGNIFICANT CHANGE UP (ref 5–17)
APPEARANCE UR: CLEAR — SIGNIFICANT CHANGE UP
AST SERPL-CCNC: 21 U/L — SIGNIFICANT CHANGE UP (ref 10–40)
BACTERIA # UR AUTO: NEGATIVE — SIGNIFICANT CHANGE UP
BILIRUB SERPL-MCNC: 0.4 MG/DL — SIGNIFICANT CHANGE UP (ref 0.2–1.2)
BILIRUB UR-MCNC: NEGATIVE — SIGNIFICANT CHANGE UP
BUN SERPL-MCNC: 11 MG/DL — SIGNIFICANT CHANGE UP (ref 7–23)
C TRACH RRNA SPEC QL NAA+PROBE: SIGNIFICANT CHANGE UP
CALCIUM SERPL-MCNC: 9 MG/DL — SIGNIFICANT CHANGE UP (ref 8.4–10.5)
CD3 BLASTS SPEC-ACNC: 1034 /UL — SIGNIFICANT CHANGE UP (ref 672–1870)
CD3 BLASTS SPEC-ACNC: 67 % — SIGNIFICANT CHANGE UP (ref 59–83)
CD4 %: 34 % — SIGNIFICANT CHANGE UP (ref 30–62)
CD8 %: 31 % — SIGNIFICANT CHANGE UP (ref 12–36)
CHLORIDE SERPL-SCNC: 105 MMOL/L — SIGNIFICANT CHANGE UP (ref 96–108)
CHOLEST SERPL-MCNC: 194 MG/DL — SIGNIFICANT CHANGE UP
CO2 SERPL-SCNC: 25 MMOL/L — SIGNIFICANT CHANGE UP (ref 22–31)
COLOR SPEC: YELLOW — SIGNIFICANT CHANGE UP
CREAT SERPL-MCNC: 0.97 MG/DL — SIGNIFICANT CHANGE UP (ref 0.5–1.3)
DIFF PNL FLD: NEGATIVE — SIGNIFICANT CHANGE UP
EPI CELLS # UR: 0 /HPF — SIGNIFICANT CHANGE UP (ref 0–5)
ESTIMATED AVERAGE GLUCOSE: 108 MG/DL — SIGNIFICANT CHANGE UP (ref 68–114)
GLUCOSE SERPL-MCNC: 102 MG/DL — HIGH (ref 70–99)
GLUCOSE UR QL: NEGATIVE — SIGNIFICANT CHANGE UP
HCT VFR BLD CALC: 47.5 % — SIGNIFICANT CHANGE UP (ref 39–50)
HCV AB S/CO SERPL IA: 0.2 S/CO — SIGNIFICANT CHANGE UP (ref 0–0.99)
HCV AB SERPL-IMP: SIGNIFICANT CHANGE UP
HDLC SERPL-MCNC: 41 MG/DL — SIGNIFICANT CHANGE UP
HGB BLD-MCNC: 15.2 G/DL — SIGNIFICANT CHANGE UP (ref 13–17)
HIV-1 VIRAL LOAD RESULT: SIGNIFICANT CHANGE UP
HIV1 RNA # SERPL NAA+PROBE: SIGNIFICANT CHANGE UP
HIV1 RNA SER-IMP: SIGNIFICANT CHANGE UP
HIV1 RNA SERPL NAA+PROBE-ACNC: SIGNIFICANT CHANGE UP
HIV1 RNA SERPL NAA+PROBE-LOG#: SIGNIFICANT CHANGE UP LG COP/ML
HYALINE CASTS # UR AUTO: 0 /LPF — SIGNIFICANT CHANGE UP (ref 0–7)
KETONES UR-MCNC: NEGATIVE — SIGNIFICANT CHANGE UP
LEUKOCYTE ESTERASE UR-ACNC: NEGATIVE — SIGNIFICANT CHANGE UP
LIPID PNL WITH DIRECT LDL SERPL: 105 MG/DL — HIGH
MCHC RBC-ENTMCNC: 30.9 PG — SIGNIFICANT CHANGE UP (ref 27–34)
MCHC RBC-ENTMCNC: 32 GM/DL — SIGNIFICANT CHANGE UP (ref 32–36)
MCV RBC AUTO: 96.5 FL — SIGNIFICANT CHANGE UP (ref 80–100)
N GONORRHOEA RRNA SPEC QL NAA+PROBE: SIGNIFICANT CHANGE UP
NITRITE UR-MCNC: NEGATIVE — SIGNIFICANT CHANGE UP
NON HDL CHOLESTEROL: 153 MG/DL — HIGH
PH UR: 6.5 — SIGNIFICANT CHANGE UP (ref 5–8)
PLATELET # BLD AUTO: 204 K/UL — SIGNIFICANT CHANGE UP (ref 150–400)
POTASSIUM SERPL-MCNC: 4.2 MMOL/L — SIGNIFICANT CHANGE UP (ref 3.5–5.3)
POTASSIUM SERPL-SCNC: 4.2 MMOL/L — SIGNIFICANT CHANGE UP (ref 3.5–5.3)
PROT SERPL-MCNC: 7.1 G/DL — SIGNIFICANT CHANGE UP (ref 6–8.3)
PROT UR-MCNC: NEGATIVE — SIGNIFICANT CHANGE UP
PSA SERPL-MCNC: 0.65 NG/ML — SIGNIFICANT CHANGE UP (ref 0–4)
RBC # BLD: 4.92 M/UL — SIGNIFICANT CHANGE UP (ref 4.2–5.8)
RBC # FLD: 13.2 % — SIGNIFICANT CHANGE UP (ref 10.3–14.5)
RBC CASTS # UR COMP ASSIST: 3 /HPF — SIGNIFICANT CHANGE UP (ref 0–4)
SODIUM SERPL-SCNC: 142 MMOL/L — SIGNIFICANT CHANGE UP (ref 135–145)
SP GR SPEC: 1.02 — SIGNIFICANT CHANGE UP (ref 1.01–1.02)
SPECIMEN SOURCE: SIGNIFICANT CHANGE UP
T PALLIDUM AB TITR SER: NEGATIVE — SIGNIFICANT CHANGE UP
T-CELL CD4 SUBSET PNL BLD: 526 /UL — SIGNIFICANT CHANGE UP (ref 489–1457)
TRIGL SERPL-MCNC: 237 MG/DL — HIGH
UROBILINOGEN FLD QL: SIGNIFICANT CHANGE UP
WBC # BLD: 4.93 K/UL — SIGNIFICANT CHANGE UP (ref 3.8–10.5)
WBC # FLD AUTO: 4.93 K/UL — SIGNIFICANT CHANGE UP (ref 3.8–10.5)
WBC UR QL: 0 /HPF — SIGNIFICANT CHANGE UP (ref 0–5)

## 2020-11-20 NOTE — PHYSICAL EXAM
[General Appearance - Alert] : alert [General Appearance - In No Acute Distress] : in no acute distress [Sclera] : the sclera and conjunctiva were normal [PERRL With Normal Accommodation] : pupils were equal in size, round, reactive to light [Extraocular Movements] : extraocular movements were intact [Outer Ear] : the ears and nose were normal in appearance [Oropharynx] : the oropharynx was normal with no thrush [Neck Appearance] : the appearance of the neck was normal [Neck Cervical Mass (___cm)] : no neck mass was observed [Jugular Venous Distention Increased] : there was no jugular-venous distention [Thyroid Diffuse Enlargement] : the thyroid was not enlarged [Auscultation Breath Sounds / Voice Sounds] : lungs were clear to auscultation bilaterally [Heart Rate And Rhythm] : heart rate was normal and rhythm regular [Heart Sounds] : normal S1 and S2 [Heart Sounds Gallop] : no gallops [Murmurs] : no murmurs [Heart Sounds Pericardial Friction Rub] : no pericardial rub [Full Pulse] : the pedal pulses are present [Edema] : there was no peripheral edema [Bowel Sounds] : normal bowel sounds [Abdomen Soft] : soft [Abdomen Tenderness] : non-tender [Abdomen Mass (___ Cm)] : no abdominal mass palpated [Costovertebral Angle Tenderness] : no CVA tenderness [No Palpable Adenopathy] : no palpable adenopathy [Musculoskeletal - Swelling] : no joint swelling [Nail Clubbing] : no clubbing  or cyanosis of the fingernails [Motor Tone] : muscle strength and tone were normal [Skin Color & Pigmentation] : normal skin color and pigmentation [] : no rash [Deep Tendon Reflexes (DTR)] : deep tendon reflexes were 2+ and symmetric [Sensation] : the sensory exam was normal to light touch and pinprick [No Focal Deficits] : no focal deficits [Oriented To Time, Place, And Person] : oriented to person, place, and time [Affect] : the affect was normal [FreeTextEntry1] : no obvious vesicular lesions

## 2020-11-20 NOTE — HISTORY OF PRESENT ILLNESS
[Sexually Active] : The patient is sexually active [Monogamous] : monogamous [FreeTextEntry1] : Patients boss was tested positive for Covid   Pt got swab last Monday and came out negative doesn't feel sick\par \par \par 51 yo male with h/o HIV, VZV, PCP, MRSA, lymphoma has  recurrent blister of left ear. I suspect this is either HSV or VZV but could this be something else? pemphigous, papillomavirus ? other?\par referred to dermatology\par . 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

## 2020-11-20 NOTE — REVIEW OF SYSTEMS
[Negative] : Heme/Lymph [FreeTextEntry3] : no active issues  one blurry than other - slightly- this si not a new problem [de-identified] : none

## 2020-11-20 NOTE — ASSESSMENT
[FreeTextEntry1] : \par 49 yo male with h/o HIV, VZV, PCP, MRSA, lymphoma has  recurrent blister of left ear. I suspect this is either HSV or VZV but could this be something else? pemphigous, papillomavirus ? other?\par referred to dermatology This lesion has not recurred so patient has not gone to the dermatologist\par BUt now Pt with numbing pain in left head this past week  no headaches\par \par hasn't gone for eye exam - needs to go\par dentist- goes every six months\par \par needs colonoscopy- will get anal pap then \par flu shot \par wiill try valtrex to see if help with left sided head numbness- if not will send to neurology\par \par Patient told to monitor BP at home\par HDL low side, encourage patient to exercise

## 2020-11-21 LAB
GAMMA INTERFERON BACKGROUND BLD IA-ACNC: 0.01 IU/ML — SIGNIFICANT CHANGE UP
M TB IFN-G BLD-IMP: NEGATIVE — SIGNIFICANT CHANGE UP
M TB IFN-G CD4+ BCKGRND COR BLD-ACNC: 0 IU/ML — SIGNIFICANT CHANGE UP
M TB IFN-G CD4+CD8+ BCKGRND COR BLD-ACNC: 0 IU/ML — SIGNIFICANT CHANGE UP
QUANT TB PLUS MITOGEN MINUS NIL: 7.13 IU/ML — SIGNIFICANT CHANGE UP
SARS-COV-2 IGG SERPL QL IA: NEGATIVE — SIGNIFICANT CHANGE UP
SARS-COV-2 IGM SERPL IA-ACNC: 0.09 INDEX — SIGNIFICANT CHANGE UP

## 2020-11-23 DIAGNOSIS — Z23 ENCOUNTER FOR IMMUNIZATION: ICD-10-CM

## 2020-11-23 DIAGNOSIS — Z85.71 PERSONAL HISTORY OF HODGKIN LYMPHOMA: ICD-10-CM

## 2020-11-23 DIAGNOSIS — B02.9 ZOSTER WITHOUT COMPLICATIONS: ICD-10-CM

## 2020-12-01 ENCOUNTER — RX RENEWAL (OUTPATIENT)
Age: 50
End: 2020-12-01

## 2020-12-31 ENCOUNTER — RX RENEWAL (OUTPATIENT)
Age: 50
End: 2020-12-31

## 2021-02-09 ENCOUNTER — RX RENEWAL (OUTPATIENT)
Age: 51
End: 2021-02-09

## 2021-04-05 ENCOUNTER — RX RENEWAL (OUTPATIENT)
Age: 51
End: 2021-04-05

## 2021-04-07 ENCOUNTER — OUTPATIENT (OUTPATIENT)
Dept: OUTPATIENT SERVICES | Facility: HOSPITAL | Age: 51
LOS: 1 days | End: 2021-04-07
Payer: MEDICARE

## 2021-04-07 DIAGNOSIS — Z01.20 ENCOUNTER FOR DENTAL EXAMINATION AND CLEANING WITHOUT ABNORMAL FINDINGS: ICD-10-CM

## 2021-04-07 DIAGNOSIS — K08.9 DISORDER OF TEETH AND SUPPORTING STRUCTURES, UNSPECIFIED: ICD-10-CM

## 2021-04-07 PROCEDURE — D0274: CPT

## 2021-04-07 PROCEDURE — D0120: CPT

## 2021-04-07 PROCEDURE — D1110: CPT

## 2021-05-19 ENCOUNTER — FORM ENCOUNTER (OUTPATIENT)
Age: 51
End: 2021-05-19

## 2021-05-20 ENCOUNTER — OUTPATIENT (OUTPATIENT)
Dept: OUTPATIENT SERVICES | Facility: HOSPITAL | Age: 51
LOS: 1 days | End: 2021-05-20
Payer: MEDICARE

## 2021-05-20 ENCOUNTER — APPOINTMENT (OUTPATIENT)
Dept: INFECTIOUS DISEASE | Facility: CLINIC | Age: 51
End: 2021-05-20
Payer: MEDICARE

## 2021-05-20 VITALS
DIASTOLIC BLOOD PRESSURE: 91 MMHG | BODY MASS INDEX: 27.89 KG/M2 | OXYGEN SATURATION: 98 % | HEIGHT: 68 IN | HEART RATE: 79 BPM | SYSTOLIC BLOOD PRESSURE: 138 MMHG | WEIGHT: 184 LBS | RESPIRATION RATE: 14 BRPM | TEMPERATURE: 98.6 F

## 2021-05-20 VITALS — SYSTOLIC BLOOD PRESSURE: 130 MMHG | DIASTOLIC BLOOD PRESSURE: 80 MMHG

## 2021-05-20 DIAGNOSIS — S00.422A: ICD-10-CM

## 2021-05-20 DIAGNOSIS — B20 HUMAN IMMUNODEFICIENCY VIRUS [HIV] DISEASE: ICD-10-CM

## 2021-05-20 PROCEDURE — G0463: CPT | Mod: 25

## 2021-05-20 PROCEDURE — 99214 OFFICE O/P EST MOD 30 MIN: CPT

## 2021-05-20 PROCEDURE — 90750 HZV VACC RECOMBINANT IM: CPT

## 2021-05-20 PROCEDURE — 90471 IMMUNIZATION ADMIN: CPT

## 2021-05-20 RX ORDER — VALACYCLOVIR 1 G/1
1 TABLET, FILM COATED ORAL
Qty: 21 | Refills: 1 | Status: COMPLETED | COMMUNITY
Start: 2020-11-19 | End: 2021-05-20

## 2021-05-22 PROBLEM — S00.422A: Status: ACTIVE | Noted: 2019-11-23

## 2021-05-22 LAB
25(OH)D3 SERPL-MCNC: 49.9 NG/ML
ALBUMIN SERPL ELPH-MCNC: 4.7 G/DL
ALP BLD-CCNC: 72 U/L
ALT SERPL-CCNC: 18 U/L
ANION GAP SERPL CALC-SCNC: 10 MMOL/L
APPEARANCE: CLEAR
AST SERPL-CCNC: 22 U/L
BACTERIA: NEGATIVE
BASOPHILS # BLD AUTO: 0.03 K/UL
BASOPHILS NFR BLD AUTO: 0.5 %
BILIRUB SERPL-MCNC: 0.6 MG/DL
BILIRUBIN URINE: NEGATIVE
BLOOD URINE: NEGATIVE
BUN SERPL-MCNC: 14 MG/DL
C TRACH RRNA SPEC QL NAA+PROBE: NOT DETECTED
CALCIUM SERPL-MCNC: 9.2 MG/DL
CD3 CELLS # BLD: 1160 /UL
CD3 CELLS NFR BLD: 66 %
CD3+CD4+ CELLS # BLD: 567 /UL
CD3+CD4+ CELLS NFR BLD: 32 %
CD3+CD4+ CELLS/CD3+CD8+ CLL SPEC: 1.03 RATIO
CD3+CD8+ CELLS # SPEC: 552 /UL
CD3+CD8+ CELLS NFR BLD: 32 %
CHLORIDE SERPL-SCNC: 108 MMOL/L
CHOLEST SERPL-MCNC: 187 MG/DL
CO2 SERPL-SCNC: 25 MMOL/L
COLOR: YELLOW
CREAT SERPL-MCNC: 1.15 MG/DL
EOSINOPHIL # BLD AUTO: 0.06 K/UL
EOSINOPHIL NFR BLD AUTO: 1.1 %
ESTIMATED AVERAGE GLUCOSE: 105 MG/DL
GLUCOSE QUALITATIVE U: NEGATIVE
GLUCOSE SERPL-MCNC: 88 MG/DL
HBA1C MFR BLD HPLC: 5.3 %
HCT VFR BLD CALC: 45.9 %
HCV AB SER QL: NONREACTIVE
HCV S/CO RATIO: 0.24 S/CO
HDLC SERPL-MCNC: 44 MG/DL
HGB BLD-MCNC: 14.8 G/DL
HIV1 RNA # SERPL NAA+PROBE: NORMAL
HIV1 RNA # SERPL NAA+PROBE: NORMAL COPIES/ML
HYALINE CASTS: 0 /LPF
IMM GRANULOCYTES NFR BLD AUTO: 0.4 %
KETONES URINE: NEGATIVE
LDLC SERPL CALC-MCNC: 105 MG/DL
LEUKOCYTE ESTERASE URINE: NEGATIVE
LYMPHOCYTES # BLD AUTO: 1.92 K/UL
LYMPHOCYTES NFR BLD AUTO: 35.2 %
M TB IFN-G BLD-IMP: NEGATIVE
MAN DIFF?: NORMAL
MCHC RBC-ENTMCNC: 30.3 PG
MCHC RBC-ENTMCNC: 32.2 GM/DL
MCV RBC AUTO: 94.1 FL
MICROSCOPIC-UA: NORMAL
MONOCYTES # BLD AUTO: 0.39 K/UL
MONOCYTES NFR BLD AUTO: 7.1 %
N GONORRHOEA RRNA SPEC QL NAA+PROBE: NOT DETECTED
NEUTROPHILS # BLD AUTO: 3.04 K/UL
NEUTROPHILS NFR BLD AUTO: 55.7 %
NITRITE URINE: NEGATIVE
NONHDLC SERPL-MCNC: 143 MG/DL
PH URINE: 6.5
PLATELET # BLD AUTO: 206 K/UL
POTASSIUM SERPL-SCNC: 4.4 MMOL/L
PROT SERPL-MCNC: 7.5 G/DL
PROTEIN URINE: NORMAL
QUANTIFERON TB PLUS MITOGEN MINUS NIL: 8.14 IU/ML
QUANTIFERON TB PLUS NIL: 0.02 IU/ML
QUANTIFERON TB PLUS TB1 MINUS NIL: 0.03 IU/ML
QUANTIFERON TB PLUS TB2 MINUS NIL: 0.03 IU/ML
RBC # BLD: 4.88 M/UL
RBC # FLD: 12.9 %
RED BLOOD CELLS URINE: 4 /HPF
SODIUM SERPL-SCNC: 144 MMOL/L
SOURCE AMPLIFICATION: NORMAL
SPECIFIC GRAVITY URINE: 1.03
SQUAMOUS EPITHELIAL CELLS: 0 /HPF
T PALLIDUM AB SER QL IA: NEGATIVE
TRIGL SERPL-MCNC: 192 MG/DL
UROBILINOGEN URINE: NORMAL
VIRAL LOAD INTERP: NORMAL
VIRAL LOAD LOG: NORMAL LG COP/ML
WBC # FLD AUTO: 5.46 K/UL
WHITE BLOOD CELLS URINE: 1 /HPF

## 2021-05-24 DIAGNOSIS — I10 ESSENTIAL (PRIMARY) HYPERTENSION: ICD-10-CM

## 2021-05-24 DIAGNOSIS — S00.422A: ICD-10-CM

## 2021-05-24 DIAGNOSIS — Z23 ENCOUNTER FOR IMMUNIZATION: ICD-10-CM

## 2021-07-07 ENCOUNTER — RX RENEWAL (OUTPATIENT)
Age: 51
End: 2021-07-07

## 2021-07-08 RX ORDER — LORATADINE 10 MG/1
10 TABLET ORAL
Qty: 30 | Refills: 3 | Status: ACTIVE | COMMUNITY
Start: 2017-09-01

## 2021-07-30 ENCOUNTER — NON-APPOINTMENT (OUTPATIENT)
Age: 51
End: 2021-07-30

## 2021-08-19 ENCOUNTER — APPOINTMENT (OUTPATIENT)
Dept: GASTROENTEROLOGY | Facility: CLINIC | Age: 51
End: 2021-08-19

## 2021-09-29 ENCOUNTER — APPOINTMENT (OUTPATIENT)
Dept: GASTROENTEROLOGY | Facility: CLINIC | Age: 51
End: 2021-09-29
Payer: MEDICARE

## 2021-09-29 VITALS
HEART RATE: 75 BPM | HEIGHT: 68 IN | BODY MASS INDEX: 27.89 KG/M2 | WEIGHT: 184 LBS | DIASTOLIC BLOOD PRESSURE: 88 MMHG | TEMPERATURE: 96.2 F | SYSTOLIC BLOOD PRESSURE: 134 MMHG | OXYGEN SATURATION: 98 %

## 2021-09-29 DIAGNOSIS — Z12.11 ENCOUNTER FOR SCREENING FOR MALIGNANT NEOPLASM OF COLON: ICD-10-CM

## 2021-09-29 PROCEDURE — 99203 OFFICE O/P NEW LOW 30 MIN: CPT

## 2021-09-29 RX ORDER — SODIUM SULFATE, POTASSIUM SULFATE, MAGNESIUM SULFATE 17.5; 3.13; 1.6 G/ML; G/ML; G/ML
17.5-3.13-1.6 SOLUTION, CONCENTRATE ORAL
Qty: 1 | Refills: 0 | Status: ACTIVE | COMMUNITY
Start: 2021-09-29 | End: 1900-01-01

## 2021-09-29 NOTE — PHYSICAL EXAM
[General Appearance - Alert] : alert [General Appearance - In No Acute Distress] : in no acute distress [Sclera] : the sclera and conjunctiva were normal [Outer Ear] : the ears and nose were normal in appearance [Neck Appearance] : the appearance of the neck was normal [] : no respiratory distress [Exaggerated Use Of Accessory Muscles For Inspiration] : no accessory muscle use [Auscultation Breath Sounds / Voice Sounds] : lungs were clear to auscultation bilaterally [Edema] : there was no peripheral edema [Bowel Sounds] : normal bowel sounds [Abdomen Soft] : soft [Abdomen Tenderness] : non-tender [Abdomen Mass (___ Cm)] : no abdominal mass palpated [No Spinal Tenderness] : no spinal tenderness [Involuntary Movements] : no involuntary movements were seen [Skin Color & Pigmentation] : normal skin color and pigmentation [No Focal Deficits] : no focal deficits [Oriented To Time, Place, And Person] : oriented to person, place, and time [Affect] : the affect was normal [Mood] : the mood was normal

## 2021-09-29 NOTE — CONSULT LETTER
[Dear  ___] : Dear  [unfilled], [Courtesy Letter:] : I had the pleasure of seeing your patient, [unfilled], in my office today. [Please see my note below.] : Please see my note below. [Consult Closing:] : Thank you very much for allowing me to participate in the care of this patient.  If you have any questions, please do not hesitate to contact me. [Sincerely,] : Sincerely, [FreeTextEntry2] : Dr. Verena Gibson [FreeTextEntry3] : Jimi Khan MD\par [facility]\par  of Medicine, [employer] at [facility]\par Tel: [phone]\par Fax: [phone]\par

## 2021-09-29 NOTE — ASSESSMENT
[FreeTextEntry1] : Impression:\par 1. Colon cancer screening - average risk, due for screening now\par \par Plan:\par -Discussed with patient the rationale for colon cancer screening, to prevent colon cancer. There are currently no recommendations for gastric CA screening unless there is FAP or other known cancer syndromes.\par -The alternatives to colonoscopy, including stool testing and virtual colonography for cancer screening was explained. The risks/benefits and limitations, including that of missed lesions associated with the alternatives and that colonoscopy is recommended if there is a positive result was explained.\par -Risks and benefits of colonoscopy including but not limited to bleeding, infection, missed lesions, perforation, anesthesia side effects were discussed with patient. All questions answered. Patient is agreeable to the procedure.\par -Explained bowel prep instructions for colonoscopy, need for chaperone home, and COVID testing 72 hours prior to procedure

## 2021-09-29 NOTE — HISTORY OF PRESENT ILLNESS
[Heartburn] : denies heartburn [Nausea] : denies nausea [Vomiting] : denies vomiting [Diarrhea] : denies diarrhea [Constipation] : denies constipation [Yellow Skin Or Eyes (Jaundice)] : denies jaundice [Abdominal Pain] : denies abdominal pain [Abdominal Swelling] : denies abdominal swelling [Wt Loss ___ Lbs] : no recent weight loss [de-identified] : This is a 51 year old male with HIV on HAART, HTN, non-hodgkin's lymphoma 11 years ago and has been in remission, who presents for evaluation of colon cancer screening.\par \par The patient had a colonoscopy many years ago as part of the lymphoma treatment without any findings. He otherwise feels well. He reports daily Bms that are formed, no melena/hematochezia. He has no abdominal pain. He has no nausea/vomiting/dysphagia/reflux. There is no colon cancer in his family. His father had gastric cancer but he was the only person in his family with gastric CA. \par \par \par 5/22/21:\par WBC 5.4, hgb 14.8, hct 45.9, \par Na 144, K 4.4, Cl 108, bicarb 25, glucose 88, BUN 14, Cr 1.15, T protein 7.5, albumin 4.7, Ca 9.2, T bili 0.6, ALP 72, AST 22, ALT 18\par CD4 count 567

## 2021-10-13 ENCOUNTER — OUTPATIENT (OUTPATIENT)
Dept: OUTPATIENT SERVICES | Facility: HOSPITAL | Age: 51
LOS: 1 days | End: 2021-10-13
Payer: MEDICARE

## 2021-10-13 DIAGNOSIS — K08.9 DISORDER OF TEETH AND SUPPORTING STRUCTURES, UNSPECIFIED: ICD-10-CM

## 2021-10-13 PROCEDURE — D1110: CPT

## 2021-10-13 PROCEDURE — D0120: CPT

## 2021-10-15 DIAGNOSIS — Z01.20 ENCOUNTER FOR DENTAL EXAMINATION AND CLEANING WITHOUT ABNORMAL FINDINGS: ICD-10-CM

## 2021-11-17 ENCOUNTER — FORM ENCOUNTER (OUTPATIENT)
Age: 51
End: 2021-11-17

## 2021-11-18 ENCOUNTER — APPOINTMENT (OUTPATIENT)
Dept: INFECTIOUS DISEASE | Facility: CLINIC | Age: 51
End: 2021-11-18
Payer: MEDICARE

## 2021-11-18 VITALS
HEART RATE: 81 BPM | SYSTOLIC BLOOD PRESSURE: 136 MMHG | HEIGHT: 68 IN | BODY MASS INDEX: 28.04 KG/M2 | TEMPERATURE: 98.7 F | WEIGHT: 185 LBS | RESPIRATION RATE: 14 BRPM | OXYGEN SATURATION: 97 % | DIASTOLIC BLOOD PRESSURE: 92 MMHG

## 2021-11-18 DIAGNOSIS — K12.1 OTHER FORMS OF STOMATITIS: ICD-10-CM

## 2021-11-18 DIAGNOSIS — R03.0 ELEVATED BLOOD-PRESSURE READING, W/OUT DIAGNOSIS OF HYPERTENSION: ICD-10-CM

## 2021-11-18 PROCEDURE — 99213 OFFICE O/P EST LOW 20 MIN: CPT | Mod: 25

## 2021-11-18 PROCEDURE — G0008: CPT

## 2021-11-18 PROCEDURE — 99214 OFFICE O/P EST MOD 30 MIN: CPT

## 2021-11-18 PROCEDURE — 90686 IIV4 VACC NO PRSV 0.5 ML IM: CPT

## 2021-11-21 PROBLEM — R03.0 BORDERLINE HYPERTENSION: Status: ACTIVE | Noted: 2021-11-21

## 2021-11-21 PROBLEM — K12.1 MOUTH ULCER: Status: ACTIVE | Noted: 2021-11-21

## 2021-12-12 ENCOUNTER — RX RENEWAL (OUTPATIENT)
Age: 51
End: 2021-12-12

## 2022-01-07 ENCOUNTER — APPOINTMENT (OUTPATIENT)
Dept: GASTROENTEROLOGY | Facility: HOSPITAL | Age: 52
End: 2022-01-07

## 2022-01-07 ENCOUNTER — OUTPATIENT (OUTPATIENT)
Dept: OUTPATIENT SERVICES | Facility: HOSPITAL | Age: 52
LOS: 1 days | End: 2022-01-07
Payer: MEDICARE

## 2022-01-07 ENCOUNTER — RESULT REVIEW (OUTPATIENT)
Age: 52
End: 2022-01-07

## 2022-01-07 VITALS
HEART RATE: 73 BPM | TEMPERATURE: 97 F | WEIGHT: 184.97 LBS | OXYGEN SATURATION: 98 % | HEIGHT: 68 IN | RESPIRATION RATE: 24 BRPM | SYSTOLIC BLOOD PRESSURE: 130 MMHG | DIASTOLIC BLOOD PRESSURE: 91 MMHG

## 2022-01-07 VITALS
DIASTOLIC BLOOD PRESSURE: 81 MMHG | OXYGEN SATURATION: 99 % | SYSTOLIC BLOOD PRESSURE: 114 MMHG | HEART RATE: 60 BPM | RESPIRATION RATE: 16 BRPM

## 2022-01-07 DIAGNOSIS — Z98.890 OTHER SPECIFIED POSTPROCEDURAL STATES: Chronic | ICD-10-CM

## 2022-01-07 DIAGNOSIS — Z12.11 ENCOUNTER FOR SCREENING FOR MALIGNANT NEOPLASM OF COLON: ICD-10-CM

## 2022-01-07 DIAGNOSIS — Z90.49 ACQUIRED ABSENCE OF OTHER SPECIFIED PARTS OF DIGESTIVE TRACT: Chronic | ICD-10-CM

## 2022-01-07 PROCEDURE — 88305 TISSUE EXAM BY PATHOLOGIST: CPT | Mod: 26

## 2022-01-07 PROCEDURE — 45385 COLONOSCOPY W/LESION REMOVAL: CPT

## 2022-01-07 PROCEDURE — 88305 TISSUE EXAM BY PATHOLOGIST: CPT

## 2022-01-07 PROCEDURE — 45380 COLONOSCOPY AND BIOPSY: CPT | Mod: 59

## 2022-01-07 PROCEDURE — 45385 COLONOSCOPY W/LESION REMOVAL: CPT | Mod: PT

## 2022-01-07 PROCEDURE — 45380 COLONOSCOPY AND BIOPSY: CPT | Mod: PT,XS

## 2022-01-07 DEVICE — NET RETRV ROT ROTH 2.5MMX230CM: Type: IMPLANTABLE DEVICE | Status: FUNCTIONAL

## 2022-01-07 RX ORDER — BICTEGRAVIR SODIUM, EMTRICITABINE, AND TENOFOVIR ALAFENAMIDE FUMARATE 30; 120; 15 MG/1; MG/1; MG/1
1 TABLET ORAL
Qty: 0 | Refills: 0 | DISCHARGE

## 2022-01-07 RX ORDER — SODIUM CHLORIDE 9 MG/ML
500 INJECTION INTRAMUSCULAR; INTRAVENOUS; SUBCUTANEOUS
Refills: 0 | Status: COMPLETED | OUTPATIENT
Start: 2022-01-07 | End: 2022-01-07

## 2022-01-07 RX ADMIN — SODIUM CHLORIDE 30 MILLILITER(S): 9 INJECTION INTRAMUSCULAR; INTRAVENOUS; SUBCUTANEOUS at 11:25

## 2022-01-07 NOTE — ASU PATIENT PROFILE, ADULT - NSICDXPASTSURGICALHX_GEN_ALL_CORE_FT
PAST SURGICAL HISTORY:  H/O colonoscopy     History of appendectomy     History of skin surgery      PAST SURGICAL HISTORY:  H/O colonoscopy     H/O lymph node biopsy     History of appendectomy

## 2022-01-07 NOTE — ASU PATIENT PROFILE, ADULT - FALL HARM RISK - UNIVERSAL INTERVENTIONS
Bed in lowest position, wheels locked, appropriate side rails in place/Call bell, personal items and telephone in reach/Instruct patient to call for assistance before getting out of bed or chair/Non-slip footwear when patient is out of bed/[place] to call system/Physically safe environment - no spills, clutter or unnecessary equipment/Purposeful Proactive Rounding/Room/bathroom lighting operational, light cord in reach

## 2022-01-07 NOTE — ASU DISCHARGE PLAN (ADULT/PEDIATRIC) - NS MD DC FALL RISK RISK
For information on Fall & Injury Prevention, visit: [email or web address] OR  [email or web address] OR  https://www.cdc.gov/steadi/patient.html

## 2022-01-07 NOTE — ASU PATIENT PROFILE, ADULT - NSICDXPASTMEDICALHX_GEN_ALL_CORE_FT
PAST MEDICAL HISTORY:  HIV disease     Non Hodgkin's lymphoma      PAST MEDICAL HISTORY:  Acute appendicitis     HIV disease     MRSA (methicillin resistant Staphylococcus aureus)     Non Hodgkin's lymphoma     Renal calculi

## 2022-01-10 LAB — SURGICAL PATHOLOGY STUDY: SIGNIFICANT CHANGE UP

## 2022-04-09 ENCOUNTER — RX RENEWAL (OUTPATIENT)
Age: 52
End: 2022-04-09

## 2022-04-12 ENCOUNTER — NON-APPOINTMENT (OUTPATIENT)
Age: 52
End: 2022-04-12

## 2022-06-22 ENCOUNTER — FORM ENCOUNTER (OUTPATIENT)
Age: 52
End: 2022-06-22

## 2022-06-22 PROBLEM — C85.90 NON-HODGKIN LYMPHOMA, UNSPECIFIED, UNSPECIFIED SITE: Chronic | Status: ACTIVE | Noted: 2022-01-07

## 2022-06-22 PROBLEM — A49.02 METHICILLIN RESISTANT STAPHYLOCOCCUS AUREUS INFECTION, UNSPECIFIED SITE: Chronic | Status: ACTIVE | Noted: 2022-01-07

## 2022-06-22 PROBLEM — K35.80 UNSPECIFIED ACUTE APPENDICITIS: Chronic | Status: ACTIVE | Noted: 2022-01-07

## 2022-06-22 PROBLEM — B20 HUMAN IMMUNODEFICIENCY VIRUS [HIV] DISEASE: Chronic | Status: ACTIVE | Noted: 2022-01-07

## 2022-06-22 PROBLEM — N20.0 CALCULUS OF KIDNEY: Chronic | Status: ACTIVE | Noted: 2022-01-07

## 2022-06-23 ENCOUNTER — APPOINTMENT (OUTPATIENT)
Dept: INFECTIOUS DISEASE | Facility: CLINIC | Age: 52
End: 2022-06-23

## 2022-06-23 ENCOUNTER — LABORATORY RESULT (OUTPATIENT)
Age: 52
End: 2022-06-23

## 2022-06-23 VITALS
WEIGHT: 183 LBS | BODY MASS INDEX: 27.74 KG/M2 | HEIGHT: 68 IN | DIASTOLIC BLOOD PRESSURE: 89 MMHG | SYSTOLIC BLOOD PRESSURE: 128 MMHG | HEART RATE: 78 BPM | TEMPERATURE: 98.9 F | OXYGEN SATURATION: 97 %

## 2022-06-23 PROCEDURE — 99214 OFFICE O/P EST MOD 30 MIN: CPT

## 2022-06-25 LAB
25(OH)D3 SERPL-MCNC: 46.4 NG/ML
AFP-TM SERPL-MCNC: 2.7 NG/ML
ALBUMIN SERPL ELPH-MCNC: 4.6 G/DL
ALP BLD-CCNC: 69 U/L
ALT SERPL-CCNC: 15 U/L
ANION GAP SERPL CALC-SCNC: 9 MMOL/L
APPEARANCE: CLEAR
AST SERPL-CCNC: 20 U/L
BACTERIA: NEGATIVE
BASOPHILS # BLD AUTO: 0.02 K/UL
BASOPHILS NFR BLD AUTO: 0.4 %
BILIRUB SERPL-MCNC: 0.5 MG/DL
BILIRUBIN URINE: NEGATIVE
BLOOD URINE: NEGATIVE
BUN SERPL-MCNC: 11 MG/DL
C TRACH RRNA SPEC QL NAA+PROBE: NOT DETECTED
CALCIUM SERPL-MCNC: 9 MG/DL
CD3 CELLS # BLD: 1061 /UL
CD3 CELLS NFR BLD: 67 %
CD3+CD4+ CELLS # BLD: 522 /UL
CD3+CD4+ CELLS NFR BLD: 33 %
CD3+CD4+ CELLS/CD3+CD8+ CLL SPEC: 1.04 RATIO
CD3+CD8+ CELLS # SPEC: 504 /UL
CD3+CD8+ CELLS NFR BLD: 32 %
CHLORIDE SERPL-SCNC: 107 MMOL/L
CHOLEST SERPL-MCNC: 187 MG/DL
CO2 SERPL-SCNC: 25 MMOL/L
COLOR: YELLOW
CREAT SERPL-MCNC: 1.06 MG/DL
EGFR: 84 ML/MIN/1.73M2
EOSINOPHIL # BLD AUTO: 0.07 K/UL
EOSINOPHIL NFR BLD AUTO: 1.3 %
ESTIMATED AVERAGE GLUCOSE: 105 MG/DL
GLUCOSE QUALITATIVE U: NEGATIVE
GLUCOSE SERPL-MCNC: 88 MG/DL
HBA1C MFR BLD HPLC: 5.3 %
HCT VFR BLD CALC: 44.5 %
HCV AB SER QL: NONREACTIVE
HCV S/CO RATIO: 0.37 S/CO
HDLC SERPL-MCNC: 44 MG/DL
HEPATITIS A IGG ANTIBODY: REACTIVE
HGB BLD-MCNC: 14.7 G/DL
HIV1 RNA # SERPL NAA+PROBE: NORMAL
HIV1 RNA # SERPL NAA+PROBE: NORMAL COPIES/ML
HYALINE CASTS: 0 /LPF
IMM GRANULOCYTES NFR BLD AUTO: 0.2 %
KETONES URINE: NEGATIVE
LDLC SERPL CALC-MCNC: 110 MG/DL
LEUKOCYTE ESTERASE URINE: NEGATIVE
LYMPHOCYTES # BLD AUTO: 1.89 K/UL
LYMPHOCYTES NFR BLD AUTO: 33.8 %
MAN DIFF?: NORMAL
MCHC RBC-ENTMCNC: 29.8 PG
MCHC RBC-ENTMCNC: 33 GM/DL
MCV RBC AUTO: 90.3 FL
MICROSCOPIC-UA: NORMAL
MONOCYTES # BLD AUTO: 0.4 K/UL
MONOCYTES NFR BLD AUTO: 7.2 %
N GONORRHOEA RRNA SPEC QL NAA+PROBE: NOT DETECTED
NEUTROPHILS # BLD AUTO: 3.2 K/UL
NEUTROPHILS NFR BLD AUTO: 57.1 %
NITRITE URINE: NEGATIVE
NONHDLC SERPL-MCNC: 143 MG/DL
PH URINE: 8
PLATELET # BLD AUTO: 211 K/UL
POTASSIUM SERPL-SCNC: 4.2 MMOL/L
PROT SERPL-MCNC: 7.6 G/DL
PROTEIN URINE: NORMAL
PSA SERPL-MCNC: 0.67 NG/ML
RBC # BLD: 4.93 M/UL
RBC # FLD: 13 %
RED BLOOD CELLS URINE: 6 /HPF
SODIUM SERPL-SCNC: 141 MMOL/L
SOURCE AMPLIFICATION: NORMAL
SPECIFIC GRAVITY URINE: 1.02
SQUAMOUS EPITHELIAL CELLS: 0 /HPF
T PALLIDUM AB SER QL IA: NEGATIVE
TRIGL SERPL-MCNC: 165 MG/DL
URINE COMMENTS: NORMAL
UROBILINOGEN URINE: NORMAL
VIRAL LOAD INTERP: NORMAL
VIRAL LOAD LOG: NORMAL LG COP/ML
WBC # FLD AUTO: 5.59 K/UL
WHITE BLOOD CELLS URINE: 0 /HPF

## 2022-08-18 ENCOUNTER — APPOINTMENT (OUTPATIENT)
Dept: INFECTIOUS DISEASE | Facility: CLINIC | Age: 52
End: 2022-08-18

## 2022-12-19 ENCOUNTER — NON-APPOINTMENT (OUTPATIENT)
Age: 52
End: 2022-12-19

## 2023-01-13 ENCOUNTER — RX RENEWAL (OUTPATIENT)
Age: 53
End: 2023-01-13

## 2023-01-19 ENCOUNTER — NON-APPOINTMENT (OUTPATIENT)
Age: 53
End: 2023-01-19

## 2023-01-23 ENCOUNTER — NON-APPOINTMENT (OUTPATIENT)
Age: 53
End: 2023-01-23

## 2023-01-26 ENCOUNTER — NON-APPOINTMENT (OUTPATIENT)
Age: 53
End: 2023-01-26

## 2023-03-14 ENCOUNTER — FORM ENCOUNTER (OUTPATIENT)
Age: 53
End: 2023-03-14

## 2023-03-15 ENCOUNTER — APPOINTMENT (OUTPATIENT)
Dept: INFECTIOUS DISEASE | Facility: CLINIC | Age: 53
End: 2023-03-15
Payer: MEDICARE

## 2023-03-15 VITALS
SYSTOLIC BLOOD PRESSURE: 152 MMHG | WEIGHT: 180 LBS | TEMPERATURE: 97.9 F | DIASTOLIC BLOOD PRESSURE: 96 MMHG | OXYGEN SATURATION: 97 % | BODY MASS INDEX: 27.28 KG/M2 | HEIGHT: 68 IN | HEART RATE: 78 BPM

## 2023-03-15 DIAGNOSIS — Z01.00 ENCOUNTER FOR EXAMINATION OF EYES AND VISION W/OUT ABNORMAL FINDINGS: ICD-10-CM

## 2023-03-15 DIAGNOSIS — Z01.20 ENCOUNTER FOR DENTAL EXAMINATION AND CLEANING W/OUT ABNORMAL FINDINGS: ICD-10-CM

## 2023-03-15 PROCEDURE — G0008: CPT

## 2023-03-15 PROCEDURE — 99214 OFFICE O/P EST MOD 30 MIN: CPT | Mod: 25

## 2023-03-15 PROCEDURE — 90686 IIV4 VACC NO PRSV 0.5 ML IM: CPT

## 2023-03-15 NOTE — PHYSICAL EXAM
[General Appearance - Alert] : alert [General Appearance - In No Acute Distress] : in no acute distress [Sclera] : the sclera and conjunctiva were normal [PERRL With Normal Accommodation] : pupils were equal in size, round, reactive to light [Extraocular Movements] : extraocular movements were intact [Outer Ear] : the ears and nose were normal in appearance [Oropharynx] : the oropharynx was normal with no thrush [Neck Appearance] : the appearance of the neck was normal [Neck Cervical Mass (___cm)] : no neck mass was observed [Jugular Venous Distention Increased] : there was no jugular-venous distention [Thyroid Diffuse Enlargement] : the thyroid was not enlarged [Auscultation Breath Sounds / Voice Sounds] : lungs were clear to auscultation bilaterally [Heart Rate And Rhythm] : heart rate was normal and rhythm regular [Heart Sounds] : normal S1 and S2 [Full Pulse] : the pedal pulses are present [Edema] : there was no peripheral edema [Bowel Sounds] : normal bowel sounds [Abdomen Soft] : soft [Abdomen Tenderness] : non-tender [No Palpable Adenopathy] : no palpable adenopathy [Musculoskeletal - Swelling] : no joint swelling [Nail Clubbing] : no clubbing  or cyanosis of the fingernails [Motor Tone] : muscle strength and tone were normal [Skin Color & Pigmentation] : normal skin color and pigmentation [] : no rash [Deep Tendon Reflexes (DTR)] : deep tendon reflexes were 2+ and symmetric [Sensation] : the sensory exam was normal to light touch and pinprick [No Focal Deficits] : no focal deficits [Oriented To Time, Place, And Person] : oriented to person, place, and time [Affect] : the affect was normal

## 2023-03-16 LAB
25(OH)D3 SERPL-MCNC: 40.8 NG/ML
ALBUMIN SERPL ELPH-MCNC: 4.6 G/DL
ALP BLD-CCNC: 76 U/L
ALT SERPL-CCNC: 12 U/L
ANION GAP SERPL CALC-SCNC: 11 MMOL/L
APPEARANCE: CLEAR
AST SERPL-CCNC: 18 U/L
BACTERIA: NEGATIVE
BASOPHILS # BLD AUTO: 0.03 K/UL
BASOPHILS NFR BLD AUTO: 0.5 %
BILIRUB SERPL-MCNC: 0.6 MG/DL
BILIRUBIN URINE: NEGATIVE
BLOOD URINE: NEGATIVE
BUN SERPL-MCNC: 13 MG/DL
CALCIUM SERPL-MCNC: 9.7 MG/DL
CD3 CELLS # BLD: 1428 CELLS/UL
CD3 CELLS NFR BLD: 65 %
CD3+CD4+ CELLS # BLD: 635 CELLS/UL
CD3+CD4+ CELLS NFR BLD: 29 %
CD3+CD4+ CELLS/CD3+CD8+ CLL SPEC: 0.86 RATIO
CD3+CD8+ CELLS # SPEC: 740 CELLS/UL
CD3+CD8+ CELLS NFR BLD: 34 %
CHLORIDE SERPL-SCNC: 106 MMOL/L
CHOLEST SERPL-MCNC: 206 MG/DL
CO2 SERPL-SCNC: 26 MMOL/L
COLOR: NORMAL
CREAT SERPL-MCNC: 1.13 MG/DL
EGFR: 78 ML/MIN/1.73M2
EOSINOPHIL # BLD AUTO: 0.08 K/UL
EOSINOPHIL NFR BLD AUTO: 1.3 %
ESTIMATED AVERAGE GLUCOSE: 111 MG/DL
GLUCOSE QUALITATIVE U: NEGATIVE
GLUCOSE SERPL-MCNC: 74 MG/DL
HBA1C MFR BLD HPLC: 5.5 %
HCT VFR BLD CALC: 45.7 %
HCV RNA SERPL NAA+PROBE-LOG IU: NOT DETECTED LOGIU/ML
HDLC SERPL-MCNC: 45 MG/DL
HEPC RNA INTERP: NOT DETECTED
HGB BLD-MCNC: 15.1 G/DL
HYALINE CASTS: 0 /LPF
IMM GRANULOCYTES NFR BLD AUTO: 0.2 %
KETONES URINE: NEGATIVE
LDLC SERPL CALC-MCNC: 129 MG/DL
LEUKOCYTE ESTERASE URINE: NEGATIVE
LYMPHOCYTES # BLD AUTO: 2.22 K/UL
LYMPHOCYTES NFR BLD AUTO: 37.2 %
MAN DIFF?: NORMAL
MCHC RBC-ENTMCNC: 29.9 PG
MCHC RBC-ENTMCNC: 33 GM/DL
MCV RBC AUTO: 90.5 FL
MICROSCOPIC-UA: NORMAL
MONOCYTES # BLD AUTO: 0.41 K/UL
MONOCYTES NFR BLD AUTO: 6.9 %
NEUTROPHILS # BLD AUTO: 3.21 K/UL
NEUTROPHILS NFR BLD AUTO: 53.9 %
NITRITE URINE: NEGATIVE
NONHDLC SERPL-MCNC: 162 MG/DL
PH URINE: 7
PLATELET # BLD AUTO: 216 K/UL
POTASSIUM SERPL-SCNC: 4.1 MMOL/L
PROT SERPL-MCNC: 7.6 G/DL
PROTEIN URINE: NORMAL
RBC # BLD: 5.05 M/UL
RBC # FLD: 12.8 %
RED BLOOD CELLS URINE: 3 /HPF
SODIUM SERPL-SCNC: 143 MMOL/L
SPECIFIC GRAVITY URINE: 1.02
SQUAMOUS EPITHELIAL CELLS: 0 /HPF
T PALLIDUM AB SER QL IA: NEGATIVE
TRIGL SERPL-MCNC: 162 MG/DL
UROBILINOGEN URINE: NORMAL
WBC # FLD AUTO: 5.96 K/UL
WHITE BLOOD CELLS URINE: 1 /HPF

## 2023-03-17 ENCOUNTER — NON-APPOINTMENT (OUTPATIENT)
Age: 53
End: 2023-03-17

## 2023-03-17 LAB
C TRACH RRNA SPEC QL NAA+PROBE: NOT DETECTED
HIV1 RNA # SERPL NAA+PROBE: ABNORMAL
HIV1 RNA # SERPL NAA+PROBE: ABNORMAL COPIES/ML
N GONORRHOEA RRNA SPEC QL NAA+PROBE: NOT DETECTED
SOURCE AMPLIFICATION: NORMAL
VIRAL LOAD INTERP: NORMAL
VIRAL LOAD LOG: ABNORMAL LG COP/ML

## 2023-03-19 LAB
M TB IFN-G BLD-IMP: NEGATIVE
QUANTIFERON TB PLUS MITOGEN MINUS NIL: >10 IU/ML
QUANTIFERON TB PLUS NIL: 0.01 IU/ML
QUANTIFERON TB PLUS TB1 MINUS NIL: 0 IU/ML
QUANTIFERON TB PLUS TB2 MINUS NIL: 0 IU/ML

## 2023-03-19 NOTE — HISTORY OF PRESENT ILLNESS
[FreeTextEntry1] : pt with some dizzy spells\par BP running in 130-140 range at home\par \par  [de-identified] : none [de-identified] : same job [de-identified] :  [de-identified] :

## 2023-03-19 NOTE — ASSESSMENT
[FreeTextEntry1] : \par \par 52 yo male with h/o HIV, VZV, PCP, MRSA, lymphoma \par \par \par  recurrent blister of left ear.\par  I suspect this is either HSV or VZV but could this be something else? pemphigous, papillomavirus ? other?\par referred to dermatology This lesion has not recurred so patient has not gone to the dermatologist\par hasn't come back since shingles shot\par \par \par Health Care Maintenanace\par hasn't gone for eye exam - needs to go\par dentist- goes every six months\par flu shot today\par set up ANAL PAP \par Up to date with Covid shots\par \par HYPERTENSION\par BP is up\par start amlodipine\par come back in 2 weeks for BP check\par give Prevnar 20 at that time\par \par \par HIV\par well controlled \par stable \par \par \par ?VZV\par pt was getting blisters in left ear, now just pain at times\par 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 \par \par \par \par \par . \par \par \par \par  \par \par \par

## 2023-03-19 NOTE — REVIEW OF SYSTEMS
[Dizziness] : dizziness [Negative] : Heme/Lymph [Fever] : no fever [Chills] : no chills [Recent Weight Gain (___ Lbs)] : no recent weight gain [Recent Weight Loss (___ Lbs)] : no recent weight loss [FreeTextEntry2] : had covid shots  bivalent nov 18,2022  got monkeypox  vaccine need flu and pneumonia shot  [FreeTextEntry3] : last  eye exam - a while   [FreeTextEntry4] : dentist - goes every 6 months

## 2023-03-20 ENCOUNTER — NON-APPOINTMENT (OUTPATIENT)
Age: 53
End: 2023-03-20

## 2023-03-29 ENCOUNTER — APPOINTMENT (OUTPATIENT)
Dept: INFECTIOUS DISEASE | Facility: CLINIC | Age: 53
End: 2023-03-29
Payer: MEDICARE

## 2023-03-29 VITALS — SYSTOLIC BLOOD PRESSURE: 143 MMHG | HEART RATE: 80 BPM | DIASTOLIC BLOOD PRESSURE: 94 MMHG

## 2023-03-29 DIAGNOSIS — Z23 ENCOUNTER FOR IMMUNIZATION: ICD-10-CM

## 2023-03-29 DIAGNOSIS — Z00.00 ENCOUNTER FOR GENERAL ADULT MEDICAL EXAMINATION W/OUT ABNORMAL FINDINGS: ICD-10-CM

## 2023-03-29 DIAGNOSIS — Z85.71 PERSONAL HISTORY OF HODGKIN LYMPHOMA: ICD-10-CM

## 2023-03-29 PROCEDURE — 90677 PCV20 VACCINE IM: CPT

## 2023-03-29 PROCEDURE — G0009: CPT

## 2023-04-01 ENCOUNTER — RX RENEWAL (OUTPATIENT)
Age: 53
End: 2023-04-01

## 2023-04-05 ENCOUNTER — APPOINTMENT (OUTPATIENT)
Dept: CARDIOLOGY | Facility: HOSPITAL | Age: 53
End: 2023-04-05

## 2023-04-05 ENCOUNTER — OUTPATIENT (OUTPATIENT)
Dept: OUTPATIENT SERVICES | Facility: HOSPITAL | Age: 53
LOS: 1 days | End: 2023-04-05
Payer: MEDICARE

## 2023-04-05 ENCOUNTER — NON-APPOINTMENT (OUTPATIENT)
Age: 53
End: 2023-04-05

## 2023-04-05 VITALS
BODY MASS INDEX: 27.28 KG/M2 | HEART RATE: 83 BPM | DIASTOLIC BLOOD PRESSURE: 86 MMHG | HEIGHT: 68 IN | WEIGHT: 180 LBS | OXYGEN SATURATION: 98 % | SYSTOLIC BLOOD PRESSURE: 124 MMHG

## 2023-04-05 DIAGNOSIS — Z98.890 OTHER SPECIFIED POSTPROCEDURAL STATES: Chronic | ICD-10-CM

## 2023-04-05 DIAGNOSIS — Z90.49 ACQUIRED ABSENCE OF OTHER SPECIFIED PARTS OF DIGESTIVE TRACT: Chronic | ICD-10-CM

## 2023-04-05 DIAGNOSIS — I25.10 ATHEROSCLEROTIC HEART DISEASE OF NATIVE CORONARY ARTERY WITHOUT ANGINA PECTORIS: ICD-10-CM

## 2023-04-05 PROCEDURE — G0463: CPT

## 2023-04-05 PROCEDURE — 93005 ELECTROCARDIOGRAM TRACING: CPT

## 2023-04-06 NOTE — PHYSICAL EXAM
[Well Developed] : well developed [Well Nourished] : well nourished [No Acute Distress] : no acute distress [Normal Conjunctiva] : normal conjunctiva [Normal Venous Pressure] : normal venous pressure [No Carotid Bruit] : no carotid bruit [Normal S1, S2] : normal S1, S2 [No Murmur] : no murmur [No Rub] : no rub [No Gallop] : no gallop [Clear Lung Fields] : clear lung fields [Good Air Entry] : good air entry [No Respiratory Distress] : no respiratory distress  [Soft] : abdomen soft [Non Tender] : non-tender [No Masses/organomegaly] : no masses/organomegaly [Normal Bowel Sounds] : normal bowel sounds [Normal Gait] : normal gait [No Edema] : no edema [No Cyanosis] : no cyanosis [No Rash] : no rash [Moves all extremities] : moves all extremities [No Focal Deficits] : no focal deficits [Normal Speech] : normal speech [Alert and Oriented] : alert and oriented

## 2023-04-07 DIAGNOSIS — I45.10 UNSPECIFIED RIGHT BUNDLE-BRANCH BLOCK: ICD-10-CM

## 2023-04-07 DIAGNOSIS — R42 DIZZINESS AND GIDDINESS: ICD-10-CM

## 2023-04-07 DIAGNOSIS — I10 ESSENTIAL (PRIMARY) HYPERTENSION: ICD-10-CM

## 2023-04-07 NOTE — REVIEW OF SYSTEMS
[Negative] : Heme/Lymph [Snoring] : snoring [SOB] : no shortness of breath [Dyspnea on exertion] : not dyspnea during exertion [Chest Discomfort] : no chest discomfort [Lower Ext Edema] : no extremity edema [Leg Claudication] : no intermittent leg claudication [Palpitations] : no palpitations [Orthopnea] : no orthopnea [PND] : no PND [Syncope] : no syncope [Cough] : no cough [Wheezing] : no wheezing [Coughing Up Blood] : no hemoptysis [Dizziness] : no dizziness [Tremor] : no tremor was seen [Numbness (Hypoesthesia)] : no numbness [Convulsions] : no convulsions [Tingling (Paresthesia)] : no tingling [Weakness] : no weakness [Limb Weakness (Paresis)] : no limb weakness (Paresis) [Speech Disturbance] : no speech disturbance [de-identified] : Lightheadedness

## 2023-04-07 NOTE — REASON FOR VISIT
[FreeTextEntry1] : 53y M with PMH HIV, Hodgkin's lymphoma, HTN presenting for HTN mgmt. Patient recently started on norvasc 2.5mg QD due to elevated BP noted over the past several weeks. Patient reports occasional BP in the 140's/90's. Additionally reports 2 episodes of lightheadedness and "feeling out of it." Episodes lasted several minutes to hours, occurred and resolved spontaneously with no identifiable trigger. Denies vertigo, presyncope, or syncope. Additionally, denies CP, dyspnea, LUJAN, HA, ab pain, LE edema, orthopnea, f/c/n/v/d, dysuria, palpitations, depression, anxiety, SI, HI. Reports occasional snoring. Reports compliance with HAART.

## 2023-04-13 LAB — TSH SERPL-ACNC: 2.05 UIU/ML

## 2023-05-02 ENCOUNTER — OUTPATIENT (OUTPATIENT)
Dept: OUTPATIENT SERVICES | Facility: HOSPITAL | Age: 53
LOS: 1 days | End: 2023-05-02
Payer: MEDICARE

## 2023-05-02 DIAGNOSIS — Z98.890 OTHER SPECIFIED POSTPROCEDURAL STATES: Chronic | ICD-10-CM

## 2023-05-02 DIAGNOSIS — Z90.49 ACQUIRED ABSENCE OF OTHER SPECIFIED PARTS OF DIGESTIVE TRACT: Chronic | ICD-10-CM

## 2023-05-02 PROCEDURE — D1110: CPT

## 2023-05-02 PROCEDURE — D0120: CPT

## 2023-05-02 PROCEDURE — D0210: CPT

## 2023-05-23 DIAGNOSIS — Z01.20 ENCOUNTER FOR DENTAL EXAMINATION AND CLEANING WITHOUT ABNORMAL FINDINGS: ICD-10-CM

## 2023-05-23 DIAGNOSIS — K08.9 DISORDER OF TEETH AND SUPPORTING STRUCTURES, UNSPECIFIED: ICD-10-CM

## 2023-05-30 ENCOUNTER — APPOINTMENT (OUTPATIENT)
Dept: CARDIOLOGY | Facility: CLINIC | Age: 53
End: 2023-05-30
Payer: MEDICARE

## 2023-05-30 PROCEDURE — 93306 TTE W/DOPPLER COMPLETE: CPT

## 2023-06-01 ENCOUNTER — RX RENEWAL (OUTPATIENT)
Age: 53
End: 2023-06-01

## 2023-06-14 ENCOUNTER — OUTPATIENT (OUTPATIENT)
Dept: OUTPATIENT SERVICES | Facility: HOSPITAL | Age: 53
LOS: 1 days | End: 2023-06-14
Payer: MEDICARE

## 2023-06-14 ENCOUNTER — APPOINTMENT (OUTPATIENT)
Dept: CARDIOLOGY | Facility: HOSPITAL | Age: 53
End: 2023-06-14

## 2023-06-14 VITALS
SYSTOLIC BLOOD PRESSURE: 122 MMHG | HEIGHT: 68 IN | BODY MASS INDEX: 27.28 KG/M2 | WEIGHT: 180 LBS | DIASTOLIC BLOOD PRESSURE: 84 MMHG | OXYGEN SATURATION: 98 % | HEART RATE: 95 BPM

## 2023-06-14 DIAGNOSIS — I45.10 UNSPECIFIED RIGHT BUNDLE-BRANCH BLOCK: ICD-10-CM

## 2023-06-14 DIAGNOSIS — Z98.890 OTHER SPECIFIED POSTPROCEDURAL STATES: Chronic | ICD-10-CM

## 2023-06-14 DIAGNOSIS — I25.10 ATHEROSCLEROTIC HEART DISEASE OF NATIVE CORONARY ARTERY WITHOUT ANGINA PECTORIS: ICD-10-CM

## 2023-06-14 DIAGNOSIS — R42 DIZZINESS AND GIDDINESS: ICD-10-CM

## 2023-06-14 DIAGNOSIS — Z90.49 ACQUIRED ABSENCE OF OTHER SPECIFIED PARTS OF DIGESTIVE TRACT: Chronic | ICD-10-CM

## 2023-06-14 PROCEDURE — 93005 ELECTROCARDIOGRAM TRACING: CPT

## 2023-06-14 PROCEDURE — G0463: CPT

## 2023-06-15 DIAGNOSIS — I10 ESSENTIAL (PRIMARY) HYPERTENSION: ICD-10-CM

## 2023-06-15 DIAGNOSIS — I45.10 UNSPECIFIED RIGHT BUNDLE-BRANCH BLOCK: ICD-10-CM

## 2023-06-15 DIAGNOSIS — R42 DIZZINESS AND GIDDINESS: ICD-10-CM

## 2023-06-15 NOTE — REASON FOR VISIT
[FreeTextEntry1] : 53y M with PMH HIV, Hodgkin's lymphoma, HTN presenting for HTN mgmt. Patient recently started on norvasc 2.5mg QD due to elevated BP; norvasc was increased to 5mg QD on last clinic visit. Patient provided home BP logs w/ sBP ranging from 110s to 120s. Pt denies CP, dyspnea, LUJAN, HA, ab pain, LE edema, orthopnea, presyncope/syncope, vertigo, f/c/n/v/d, dysuria, palpitations, depression, anxiety, SI, HI. Reports compliance with HAART. \par \par TTE 5/2023\par EF: 64%\par Normal LV systolic and diastolic function\par Normal RV size and function\par Mild MR/TR, no AI\par Normal pulm pressures

## 2023-06-15 NOTE — REVIEW OF SYSTEMS
[Snoring] : snoring [Negative] : Heme/Lymph [SOB] : no shortness of breath [Dyspnea on exertion] : not dyspnea during exertion [Chest Discomfort] : no chest discomfort [Lower Ext Edema] : no extremity edema [Leg Claudication] : no intermittent leg claudication [Palpitations] : no palpitations [Orthopnea] : no orthopnea [PND] : no PND [Syncope] : no syncope [Cough] : no cough [Wheezing] : no wheezing [Coughing Up Blood] : no hemoptysis

## 2023-06-15 NOTE — PHYSICAL EXAM
[Well Developed] : well developed [Well Nourished] : well nourished [No Acute Distress] : no acute distress [Normal Venous Pressure] : normal venous pressure [Normal S1, S2] : normal S1, S2 [No Murmur] : no murmur [No Rub] : no rub [No Gallop] : no gallop [Clear Lung Fields] : clear lung fields [Good Air Entry] : good air entry [No Respiratory Distress] : no respiratory distress  [Soft] : abdomen soft [Non Tender] : non-tender [No Masses/organomegaly] : no masses/organomegaly [Normal Bowel Sounds] : normal bowel sounds [Normal Gait] : normal gait [No Edema] : no edema [No Cyanosis] : no cyanosis [No Clubbing] : no clubbing [No Varicosities] : no varicosities [No Rash] : no rash [No Skin Lesions] : no skin lesions [Moves all extremities] : moves all extremities [No Focal Deficits] : no focal deficits [Normal Speech] : normal speech [Alert and Oriented] : alert and oriented

## 2023-07-12 ENCOUNTER — NON-APPOINTMENT (OUTPATIENT)
Age: 53
End: 2023-07-12

## 2023-09-17 ENCOUNTER — RX RENEWAL (OUTPATIENT)
Age: 53
End: 2023-09-17

## 2023-11-13 ENCOUNTER — APPOINTMENT (OUTPATIENT)
Age: 53
End: 2023-11-13

## 2023-11-13 ENCOUNTER — APPOINTMENT (OUTPATIENT)
Age: 53
End: 2023-11-13
Payer: COMMERCIAL

## 2023-11-13 ENCOUNTER — OUTPATIENT (OUTPATIENT)
Dept: OUTPATIENT SERVICES | Facility: HOSPITAL | Age: 53
LOS: 1 days | End: 2023-11-13
Payer: COMMERCIAL

## 2023-11-13 DIAGNOSIS — Z98.890 OTHER SPECIFIED POSTPROCEDURAL STATES: Chronic | ICD-10-CM

## 2023-11-13 DIAGNOSIS — Z90.49 ACQUIRED ABSENCE OF OTHER SPECIFIED PARTS OF DIGESTIVE TRACT: Chronic | ICD-10-CM

## 2023-11-13 PROCEDURE — ZZZZZ: CPT

## 2023-11-13 PROCEDURE — D0120: CPT

## 2023-11-13 PROCEDURE — D1110: CPT

## 2023-11-13 PROCEDURE — ZZZZZ: CPT | Mod: 1L

## 2023-11-13 PROCEDURE — D2392: CPT

## 2023-11-20 DIAGNOSIS — K02.9 DENTAL CARIES, UNSPECIFIED: ICD-10-CM

## 2023-11-20 DIAGNOSIS — Z01.20 ENCOUNTER FOR DENTAL EXAMINATION AND CLEANING WITHOUT ABNORMAL FINDINGS: ICD-10-CM

## 2023-12-12 ENCOUNTER — RX RENEWAL (OUTPATIENT)
Age: 53
End: 2023-12-12

## 2023-12-20 ENCOUNTER — OUTPATIENT (OUTPATIENT)
Dept: OUTPATIENT SERVICES | Facility: HOSPITAL | Age: 53
LOS: 1 days | End: 2023-12-20
Payer: COMMERCIAL

## 2023-12-20 ENCOUNTER — APPOINTMENT (OUTPATIENT)
Dept: CARDIOLOGY | Facility: HOSPITAL | Age: 53
End: 2023-12-20

## 2023-12-20 ENCOUNTER — NON-APPOINTMENT (OUTPATIENT)
Age: 53
End: 2023-12-20

## 2023-12-20 VITALS
OXYGEN SATURATION: 97 % | SYSTOLIC BLOOD PRESSURE: 118 MMHG | DIASTOLIC BLOOD PRESSURE: 79 MMHG | HEIGHT: 68 IN | BODY MASS INDEX: 26.98 KG/M2 | WEIGHT: 178 LBS | HEART RATE: 79 BPM

## 2023-12-20 DIAGNOSIS — Z90.49 ACQUIRED ABSENCE OF OTHER SPECIFIED PARTS OF DIGESTIVE TRACT: Chronic | ICD-10-CM

## 2023-12-20 DIAGNOSIS — I25.10 ATHEROSCLEROTIC HEART DISEASE OF NATIVE CORONARY ARTERY WITHOUT ANGINA PECTORIS: ICD-10-CM

## 2023-12-20 DIAGNOSIS — Z98.890 OTHER SPECIFIED POSTPROCEDURAL STATES: Chronic | ICD-10-CM

## 2023-12-20 PROCEDURE — G0463: CPT

## 2023-12-20 PROCEDURE — 93005 ELECTROCARDIOGRAM TRACING: CPT

## 2023-12-20 NOTE — REVIEW OF SYSTEMS
[Fever] : no fever [Chills] : no chills [SOB] : no shortness of breath [Chest Discomfort] : no chest discomfort [Lower Ext Edema] : no extremity edema [Leg Claudication] : no intermittent leg claudication [Orthopnea] : no orthopnea [PND] : no PND [Syncope] : no syncope [Abdominal Pain] : no abdominal pain [Nausea] : no nausea [Vomiting] : no vomiting [Dizziness] : no dizziness

## 2023-12-20 NOTE — CARDIOLOGY SUMMARY
[de-identified] : TTE 5/2023  EF: 64%  Normal LV systolic and diastolic function  Normal RV size and function  Mild MR/TR, no AI  Normal pulm pressures

## 2023-12-20 NOTE — PHYSICAL EXAM
[Well Developed] : well developed [Well Nourished] : well nourished [No Acute Distress] : no acute distress [Normal Conjunctiva] : normal conjunctiva [Normal Venous Pressure] : normal venous pressure [Normal S1, S2] : normal S1, S2 [No Murmur] : no murmur [No Rub] : no rub [Clear Lung Fields] : clear lung fields [Good Air Entry] : good air entry [No Respiratory Distress] : no respiratory distress  [Soft] : abdomen soft [Non Tender] : non-tender [Normal Gait] : normal gait [No Edema] : no edema [Moves all extremities] : moves all extremities [Alert and Oriented] : alert and oriented

## 2023-12-20 NOTE — REASON FOR VISIT
[FreeTextEntry1] : 53y M with PMH HIV, Hodgkin's lymphoma, HTN here for follow up. Patient has no complaints. Denies CP/SOB, palpitations, LH/dizziness. Takes amlodipine intermittently. Last dose was 3 days ago. Was not aware to take Norvasc everyday. Denies any LE edema or other symptoms related to medication. Good adherence to Biktarvy. Has some snoring has not done  his sleep apnea test.

## 2023-12-20 NOTE — ASSESSMENT
[FreeTextEntry1] : 53y M with PMH HIV, Hodgkin's lymphoma, HTN here for follow up.  1. HTN - well controlled - Continue Norvasc 5 mg daily - Low salt diet - Sleep apnea test reordered   RTC 6 months to 1 year  Herman Knight MD Cardiology Fellow

## 2023-12-26 DIAGNOSIS — I10 ESSENTIAL (PRIMARY) HYPERTENSION: ICD-10-CM

## 2024-01-04 ENCOUNTER — APPOINTMENT (OUTPATIENT)
Dept: INFECTIOUS DISEASE | Facility: CLINIC | Age: 54
End: 2024-01-04
Payer: MEDICARE

## 2024-01-04 ENCOUNTER — OUTPATIENT (OUTPATIENT)
Dept: OUTPATIENT SERVICES | Facility: HOSPITAL | Age: 54
LOS: 1 days | End: 2024-01-04
Payer: MEDICARE

## 2024-01-04 ENCOUNTER — LABORATORY RESULT (OUTPATIENT)
Age: 54
End: 2024-01-04

## 2024-01-04 VITALS
OXYGEN SATURATION: 98 % | TEMPERATURE: 99.3 F | BODY MASS INDEX: 26.76 KG/M2 | WEIGHT: 176 LBS | HEART RATE: 101 BPM | DIASTOLIC BLOOD PRESSURE: 79 MMHG | SYSTOLIC BLOOD PRESSURE: 121 MMHG

## 2024-01-04 DIAGNOSIS — Z98.890 OTHER SPECIFIED POSTPROCEDURAL STATES: Chronic | ICD-10-CM

## 2024-01-04 DIAGNOSIS — B20 HUMAN IMMUNODEFICIENCY VIRUS [HIV] DISEASE: ICD-10-CM

## 2024-01-04 DIAGNOSIS — R50.9 FEVER, UNSPECIFIED: ICD-10-CM

## 2024-01-04 DIAGNOSIS — R05.9 COUGH, UNSPECIFIED: ICD-10-CM

## 2024-01-04 DIAGNOSIS — Z90.49 ACQUIRED ABSENCE OF OTHER SPECIFIED PARTS OF DIGESTIVE TRACT: Chronic | ICD-10-CM

## 2024-01-04 PROCEDURE — 0225U NFCT DS DNA&RNA 21 SARSCOV2: CPT

## 2024-01-04 PROCEDURE — 99213 OFFICE O/P EST LOW 20 MIN: CPT

## 2024-01-04 PROCEDURE — G0463: CPT

## 2024-01-05 ENCOUNTER — NON-APPOINTMENT (OUTPATIENT)
Age: 54
End: 2024-01-05

## 2024-01-05 DIAGNOSIS — B20 HUMAN IMMUNODEFICIENCY VIRUS [HIV] DISEASE: ICD-10-CM

## 2024-01-05 DIAGNOSIS — Z79.899 OTHER LONG TERM (CURRENT) DRUG THERAPY: ICD-10-CM

## 2024-01-05 DIAGNOSIS — I10 ESSENTIAL (PRIMARY) HYPERTENSION: ICD-10-CM

## 2024-01-05 LAB
RAPID RVP RESULT: DETECTED
RAPID RVP RESULT: DETECTED
SARS-COV-2 RNA SPEC QL NAA+PROBE: DETECTED
SARS-COV-2 RNA SPEC QL NAA+PROBE: DETECTED

## 2024-01-06 PROBLEM — B20 HIV DISEASE: Status: ACTIVE | Noted: 2024-01-06

## 2024-01-06 PROBLEM — R05.9 COUGH: Status: ACTIVE | Noted: 2024-01-06

## 2024-01-06 RX ORDER — NIRMATRELVIR AND RITONAVIR 300-100 MG
20 X 150 MG & KIT ORAL
Qty: 1 | Refills: 0 | Status: ACTIVE | COMMUNITY
Start: 2024-01-05

## 2024-01-06 NOTE — PHYSICAL EXAM
[Heart Sounds] : normal S1 and S2 [General Appearance - Alert] : alert [General Appearance - In No Acute Distress] : in no acute distress [Sclera] : the sclera and conjunctiva were normal [PERRL With Normal Accommodation] : pupils were equal in size, round, reactive to light [Extraocular Movements] : extraocular movements were intact [Outer Ear] : the ears and nose were normal in appearance [Oropharynx] : the oropharynx was normal with no thrush [Neck Appearance] : the appearance of the neck was normal [Neck Cervical Mass (___cm)] : no neck mass was observed [Jugular Venous Distention Increased] : there was no jugular-venous distention [Auscultation Breath Sounds / Voice Sounds] : lungs were clear to auscultation bilaterally [FreeTextEntry1] : [place] [Edema] : there was no peripheral edema [Bowel Sounds] : normal bowel sounds [Abdomen Soft] : soft [Abdomen Tenderness] : non-tender [No Palpable Adenopathy] : no palpable adenopathy [Musculoskeletal - Swelling] : no joint swelling [Nail Clubbing] : no clubbing  or cyanosis of the fingernails [Motor Tone] : muscle strength and tone were normal [Skin Color & Pigmentation] : normal skin color and pigmentation [] : no rash [Oriented To Time, Place, And Person] : oriented to person, place, and time [Affect] : the affect was normal

## 2024-01-06 NOTE — REVIEW OF SYSTEMS
[FreeTextEntry2] : hasn't had flu shot  no covid booster [FreeTextEntry3] : no recent ecam - needs to make appointment [FreeTextEntry4] : goes to dentist q 6 months [FreeTextEntry7] : had a colonsocpoy when turned 50 [de-identified] : right chest

## 2024-01-06 NOTE — HISTORY OF PRESENT ILLNESS
[FreeTextEntry1] :  had Covid New Years kenan and pt now with sore throat and low grade fever sleeping separately since found out slight cough  no lost of taste no diarrhea notes right chest vince no recent travel  [place] in may

## 2024-01-06 NOTE — ASSESSMENT
[FreeTextEntry1] : 52 yo male with h/o HIV, VZV, PCP, MRSA, lymphoma, htn #FEVER pt with fever and tachycardia pt was exposed to covid i suspect pt with COvid will do RVP and covid testing will check with pharmD if pt is a candidate for Paxlovis   HYPERTENSION recently started meds saw cardiologist- nl LV fx, given amlodipine 5 mg daily they ordered sleep apnea testing  HIV well controlled stable will have pt come back for blood work, suspect Covid today  ?VZV pt was getting blisters in left ear, now just pain at times I suspect its a VZV infection responded to Valtrex hopefully shingles vaccine will be helpful.          .

## 2024-01-08 DIAGNOSIS — R50.9 FEVER, UNSPECIFIED: ICD-10-CM

## 2024-01-08 DIAGNOSIS — R05.9 COUGH, UNSPECIFIED: ICD-10-CM

## 2024-01-17 ENCOUNTER — OUTPATIENT (OUTPATIENT)
Dept: OUTPATIENT SERVICES | Facility: HOSPITAL | Age: 54
LOS: 1 days | End: 2024-01-17

## 2024-01-17 ENCOUNTER — OUTPATIENT (OUTPATIENT)
Dept: OUTPATIENT SERVICES | Facility: HOSPITAL | Age: 54
LOS: 1 days | End: 2024-01-17
Payer: MEDICARE

## 2024-01-17 ENCOUNTER — APPOINTMENT (OUTPATIENT)
Dept: INFECTIOUS DISEASE | Facility: CLINIC | Age: 54
End: 2024-01-17
Payer: COMMERCIAL

## 2024-01-17 DIAGNOSIS — Z90.49 ACQUIRED ABSENCE OF OTHER SPECIFIED PARTS OF DIGESTIVE TRACT: Chronic | ICD-10-CM

## 2024-01-17 DIAGNOSIS — Z98.890 OTHER SPECIFIED POSTPROCEDURAL STATES: Chronic | ICD-10-CM

## 2024-01-17 DIAGNOSIS — Z23 ENCOUNTER FOR IMMUNIZATION: ICD-10-CM

## 2024-01-17 DIAGNOSIS — B20 HUMAN IMMUNODEFICIENCY VIRUS [HIV] DISEASE: ICD-10-CM

## 2024-01-17 DIAGNOSIS — R05.9 COUGH, UNSPECIFIED: ICD-10-CM

## 2024-01-17 DIAGNOSIS — R50.9 FEVER, UNSPECIFIED: ICD-10-CM

## 2024-01-17 LAB
25(OH)D3 SERPL-MCNC: 35.1 NG/ML
ALBUMIN SERPL ELPH-MCNC: 4.5 G/DL
ALP BLD-CCNC: 72 U/L
ALT SERPL-CCNC: 16 U/L
ANION GAP SERPL CALC-SCNC: 12 MMOL/L
APPEARANCE: CLEAR
AST SERPL-CCNC: 17 U/L
BACTERIA: NEGATIVE /HPF
BASOPHILS # BLD AUTO: 0.03 K/UL
BASOPHILS NFR BLD AUTO: 0.5 %
BILIRUB SERPL-MCNC: 0.5 MG/DL
BILIRUBIN URINE: NEGATIVE
BLOOD URINE: NEGATIVE
BUN SERPL-MCNC: 13 MG/DL
C TRACH RRNA SPEC QL NAA+PROBE: NOT DETECTED
CALCIUM SERPL-MCNC: 9.3 MG/DL
CAST: 1 /LPF
CD3 CELLS # BLD: 1434 CELLS/UL
CD3 CELLS NFR BLD: 69 %
CD3+CD4+ CELLS # BLD: 725 CELLS/UL
CD3+CD4+ CELLS NFR BLD: 35 %
CD3+CD4+ CELLS/CD3+CD8+ CLL SPEC: 1.1 RATIO
CD3+CD8+ CELLS # SPEC: 658 CELLS/UL
CD3+CD8+ CELLS NFR BLD: 32 %
CHLORIDE SERPL-SCNC: 104 MMOL/L
CHOLEST SERPL-MCNC: 212 MG/DL
CO2 SERPL-SCNC: 24 MMOL/L
COLOR: YELLOW
CREAT SERPL-MCNC: 1.08 MG/DL
EGFR: 82 ML/MIN/1.73M2
EOSINOPHIL # BLD AUTO: 0.15 K/UL
EOSINOPHIL NFR BLD AUTO: 2.6 %
EPITHELIAL CELLS: 0 /HPF
ESTIMATED AVERAGE GLUCOSE: 114 MG/DL
GLUCOSE QUALITATIVE U: NEGATIVE MG/DL
GLUCOSE SERPL-MCNC: 136 MG/DL
HBA1C MFR BLD HPLC: 5.6 %
HCT VFR BLD CALC: 42.6 %
HDLC SERPL-MCNC: 43 MG/DL
HGB BLD-MCNC: 14.2 G/DL
IMM GRANULOCYTES NFR BLD AUTO: 0.5 %
KETONES URINE: NEGATIVE MG/DL
LDLC SERPL CALC-MCNC: 140 MG/DL
LEUKOCYTE ESTERASE URINE: NEGATIVE
LYMPHOCYTES # BLD AUTO: 2.02 K/UL
LYMPHOCYTES NFR BLD AUTO: 34.4 %
MAN DIFF?: NORMAL
MCHC RBC-ENTMCNC: 30.1 PG
MCHC RBC-ENTMCNC: 33.3 GM/DL
MCV RBC AUTO: 90.3 FL
MICROSCOPIC-UA: NORMAL
MONOCYTES # BLD AUTO: 0.34 K/UL
MONOCYTES NFR BLD AUTO: 5.8 %
N GONORRHOEA RRNA SPEC QL NAA+PROBE: NOT DETECTED
NEUTROPHILS # BLD AUTO: 3.31 K/UL
NEUTROPHILS NFR BLD AUTO: 56.2 %
NITRITE URINE: NEGATIVE
NONHDLC SERPL-MCNC: 169 MG/DL
PH URINE: 5.5
PLATELET # BLD AUTO: 242 K/UL
POTASSIUM SERPL-SCNC: 4 MMOL/L
PROT SERPL-MCNC: 7.1 G/DL
PROTEIN URINE: NEGATIVE MG/DL
PSA SERPL-MCNC: 0.82 NG/ML
RBC # BLD: 4.72 M/UL
RBC # FLD: 12.5 %
RED BLOOD CELLS URINE: 3 /HPF
SODIUM SERPL-SCNC: 140 MMOL/L
SOURCE AMPLIFICATION: NORMAL
SPECIFIC GRAVITY URINE: 1.02
T PALLIDUM AB SER QL IA: NEGATIVE
TRIGL SERPL-MCNC: 162 MG/DL
UROBILINOGEN URINE: 0.2 MG/DL
WBC # FLD AUTO: 5.88 K/UL
WHITE BLOOD CELLS URINE: 0 /HPF

## 2024-01-17 PROCEDURE — ZZZZZ: CPT

## 2024-01-17 PROCEDURE — 90688 IIV4 VACCINE SPLT 0.5 ML IM: CPT

## 2024-01-17 PROCEDURE — G0008: CPT

## 2024-01-21 LAB
HIV1 RNA # SERPL NAA+PROBE: NORMAL
HIV1 RNA # SERPL NAA+PROBE: NORMAL COPIES/ML
VIRAL LOAD INTERP: NORMAL
VIRAL LOAD LOG: NORMAL LG COP/ML

## 2024-03-02 ENCOUNTER — RX RENEWAL (OUTPATIENT)
Age: 54
End: 2024-03-02

## 2024-04-01 ENCOUNTER — OUTPATIENT (OUTPATIENT)
Dept: OUTPATIENT SERVICES | Facility: HOSPITAL | Age: 54
LOS: 1 days | End: 2024-04-01
Payer: COMMERCIAL

## 2024-04-01 ENCOUNTER — APPOINTMENT (OUTPATIENT)
Age: 54
End: 2024-04-01
Payer: COMMERCIAL

## 2024-04-01 DIAGNOSIS — Z98.890 OTHER SPECIFIED POSTPROCEDURAL STATES: Chronic | ICD-10-CM

## 2024-04-01 DIAGNOSIS — Z90.49 ACQUIRED ABSENCE OF OTHER SPECIFIED PARTS OF DIGESTIVE TRACT: Chronic | ICD-10-CM

## 2024-04-01 PROCEDURE — D0140: CPT

## 2024-04-01 PROCEDURE — D0220: CPT

## 2024-04-01 PROCEDURE — ZZZZZ: CPT

## 2024-04-06 ENCOUNTER — RX RENEWAL (OUTPATIENT)
Age: 54
End: 2024-04-06

## 2024-04-06 RX ORDER — BICTEGRAVIR SODIUM, EMTRICITABINE, AND TENOFOVIR ALAFENAMIDE FUMARATE 50; 200; 25 MG/1; MG/1; MG/1
50-200-25 TABLET ORAL
Qty: 30 | Refills: 0 | Status: ACTIVE | COMMUNITY
Start: 2019-05-22 | End: 1900-01-01

## 2024-04-09 RX ORDER — AMLODIPINE BESYLATE 5 MG/1
5 TABLET ORAL DAILY
Qty: 90 | Refills: 1 | Status: ACTIVE | COMMUNITY
Start: 2023-03-15 | End: 1900-01-01

## 2024-04-11 ENCOUNTER — APPOINTMENT (OUTPATIENT)
Age: 54
End: 2024-04-11
Payer: COMMERCIAL

## 2024-04-11 PROCEDURE — ZZZZZ: CPT

## 2024-05-01 ENCOUNTER — APPOINTMENT (OUTPATIENT)
Age: 54
End: 2024-05-01
Payer: COMMERCIAL

## 2024-05-01 PROCEDURE — ZZZZZ: CPT

## 2024-05-07 DIAGNOSIS — Z01.20 ENCOUNTER FOR DENTAL EXAMINATION AND CLEANING WITHOUT ABNORMAL FINDINGS: ICD-10-CM

## 2024-05-08 ENCOUNTER — OUTPATIENT (OUTPATIENT)
Dept: OUTPATIENT SERVICES | Facility: HOSPITAL | Age: 54
LOS: 1 days | End: 2024-05-08
Payer: COMMERCIAL

## 2024-05-08 ENCOUNTER — APPOINTMENT (OUTPATIENT)
Age: 54
End: 2024-05-08
Payer: COMMERCIAL

## 2024-05-08 DIAGNOSIS — Z90.49 ACQUIRED ABSENCE OF OTHER SPECIFIED PARTS OF DIGESTIVE TRACT: Chronic | ICD-10-CM

## 2024-05-08 DIAGNOSIS — Z98.890 OTHER SPECIFIED POSTPROCEDURAL STATES: Chronic | ICD-10-CM

## 2024-05-08 PROCEDURE — ZZZZZ: CPT

## 2024-05-08 PROCEDURE — D3330: CPT

## 2024-05-29 DIAGNOSIS — K02.7 DENTAL ROOT CARIES: ICD-10-CM

## 2024-06-03 ENCOUNTER — APPOINTMENT (OUTPATIENT)
Age: 54
End: 2024-06-03
Payer: COMMERCIAL

## 2024-06-03 ENCOUNTER — OUTPATIENT (OUTPATIENT)
Dept: OUTPATIENT SERVICES | Facility: HOSPITAL | Age: 54
LOS: 1 days | End: 2024-06-03
Payer: COMMERCIAL

## 2024-06-03 DIAGNOSIS — Z98.890 OTHER SPECIFIED POSTPROCEDURAL STATES: Chronic | ICD-10-CM

## 2024-06-03 PROCEDURE — ZZZZZ: CPT

## 2024-06-03 PROCEDURE — D0120: CPT

## 2024-06-13 ENCOUNTER — APPOINTMENT (OUTPATIENT)
Age: 54
End: 2024-06-13
Payer: COMMERCIAL

## 2024-06-13 PROCEDURE — ZZZZZ: CPT

## 2024-06-28 ENCOUNTER — APPOINTMENT (OUTPATIENT)
Age: 54
End: 2024-06-28
Payer: COMMERCIAL

## 2024-06-28 PROCEDURE — ZZZZZ: CPT

## 2024-07-15 ENCOUNTER — LABORATORY RESULT (OUTPATIENT)
Age: 54
End: 2024-07-15

## 2024-07-15 ENCOUNTER — OUTPATIENT (OUTPATIENT)
Dept: OUTPATIENT SERVICES | Facility: HOSPITAL | Age: 54
LOS: 1 days | End: 2024-07-15
Payer: COMMERCIAL

## 2024-07-15 ENCOUNTER — APPOINTMENT (OUTPATIENT)
Dept: INFECTIOUS DISEASE | Facility: CLINIC | Age: 54
End: 2024-07-15
Payer: COMMERCIAL

## 2024-07-15 VITALS
DIASTOLIC BLOOD PRESSURE: 84 MMHG | HEART RATE: 93 BPM | SYSTOLIC BLOOD PRESSURE: 134 MMHG | HEIGHT: 68 IN | TEMPERATURE: 99 F | BODY MASS INDEX: 26.67 KG/M2 | WEIGHT: 176 LBS | OXYGEN SATURATION: 98 %

## 2024-07-15 DIAGNOSIS — B20 HUMAN IMMUNODEFICIENCY VIRUS [HIV] DISEASE: ICD-10-CM

## 2024-07-15 DIAGNOSIS — M79.673 PAIN IN UNSPECIFIED FOOT: ICD-10-CM

## 2024-07-15 DIAGNOSIS — S00.422A: ICD-10-CM

## 2024-07-15 PROCEDURE — 82105 ALPHA-FETOPROTEIN SERUM: CPT

## 2024-07-15 PROCEDURE — 86780 TREPONEMA PALLIDUM: CPT

## 2024-07-15 PROCEDURE — ZZZZZ: CPT

## 2024-07-15 PROCEDURE — 86803 HEPATITIS C AB TEST: CPT

## 2024-07-15 PROCEDURE — 87491 CHLMYD TRACH DNA AMP PROBE: CPT

## 2024-07-15 PROCEDURE — 87529 HSV DNA AMP PROBE: CPT

## 2024-07-15 PROCEDURE — 87591 N.GONORRHOEAE DNA AMP PROB: CPT

## 2024-07-15 PROCEDURE — 83036 HEMOGLOBIN GLYCOSYLATED A1C: CPT

## 2024-07-15 PROCEDURE — G0463: CPT

## 2024-07-15 PROCEDURE — 87536 HIV-1 QUANT&REVRSE TRNSCRPJ: CPT

## 2024-07-15 PROCEDURE — 80061 LIPID PANEL: CPT

## 2024-07-15 PROCEDURE — 82306 VITAMIN D 25 HYDROXY: CPT

## 2024-07-15 PROCEDURE — 81001 URINALYSIS AUTO W/SCOPE: CPT

## 2024-07-15 PROCEDURE — G0103: CPT

## 2024-07-15 PROCEDURE — 80053 COMPREHEN METABOLIC PANEL: CPT

## 2024-07-15 PROCEDURE — 87798 DETECT AGENT NOS DNA AMP: CPT

## 2024-07-15 PROCEDURE — 85027 COMPLETE CBC AUTOMATED: CPT

## 2024-07-16 LAB
24R-OH-CALCIDIOL SERPL-MCNC: 45 NG/ML — SIGNIFICANT CHANGE UP (ref 30–80)
A1C WITH ESTIMATED AVERAGE GLUCOSE RESULT: 5.6 % — SIGNIFICANT CHANGE UP (ref 4–5.6)
AFP-TM SERPL-MCNC: 2.4 NG/ML — SIGNIFICANT CHANGE UP
ALBUMIN SERPL ELPH-MCNC: 4.5 G/DL — SIGNIFICANT CHANGE UP (ref 3.3–5)
ALP SERPL-CCNC: 68 U/L — SIGNIFICANT CHANGE UP (ref 40–120)
ALT FLD-CCNC: 18 U/L — SIGNIFICANT CHANGE UP (ref 10–45)
ANION GAP SERPL CALC-SCNC: 12 MMOL/L — SIGNIFICANT CHANGE UP (ref 5–17)
APPEARANCE UR: CLEAR — SIGNIFICANT CHANGE UP
AST SERPL-CCNC: 22 U/L — SIGNIFICANT CHANGE UP (ref 10–40)
BACTERIA # UR AUTO: NEGATIVE /HPF — SIGNIFICANT CHANGE UP
BILIRUB SERPL-MCNC: 0.6 MG/DL — SIGNIFICANT CHANGE UP (ref 0.2–1.2)
BILIRUB UR-MCNC: NEGATIVE — SIGNIFICANT CHANGE UP
BUN SERPL-MCNC: 13 MG/DL — SIGNIFICANT CHANGE UP (ref 7–23)
C TRACH RRNA SPEC QL NAA+PROBE: SIGNIFICANT CHANGE UP
CALCIUM SERPL-MCNC: 9.1 MG/DL — SIGNIFICANT CHANGE UP (ref 8.4–10.5)
CAST: 1 /LPF — SIGNIFICANT CHANGE UP (ref 0–4)
CHLORIDE SERPL-SCNC: 104 MMOL/L — SIGNIFICANT CHANGE UP (ref 96–108)
CHOLEST SERPL-MCNC: 206 MG/DL — HIGH
CO2 SERPL-SCNC: 23 MMOL/L — SIGNIFICANT CHANGE UP (ref 22–31)
COD CRY URNS QL: PRESENT
COLOR SPEC: YELLOW — SIGNIFICANT CHANGE UP
CREAT SERPL-MCNC: 1.11 MG/DL — SIGNIFICANT CHANGE UP (ref 0.5–1.3)
DIFF PNL FLD: NEGATIVE — SIGNIFICANT CHANGE UP
EGFR: 79 ML/MIN/1.73M2 — SIGNIFICANT CHANGE UP
ESTIMATED AVERAGE GLUCOSE: 114 MG/DL — SIGNIFICANT CHANGE UP (ref 68–114)
GLUCOSE SERPL-MCNC: 101 MG/DL — HIGH (ref 70–99)
GLUCOSE UR QL: NEGATIVE MG/DL — SIGNIFICANT CHANGE UP
HCT VFR BLD CALC: 45.4 % — SIGNIFICANT CHANGE UP (ref 39–50)
HCV AB S/CO SERPL IA: 0.3 S/CO — SIGNIFICANT CHANGE UP (ref 0–0.99)
HCV AB SERPL-IMP: SIGNIFICANT CHANGE UP
HDLC SERPL-MCNC: 46 MG/DL — SIGNIFICANT CHANGE UP
HGB BLD-MCNC: 15 G/DL — SIGNIFICANT CHANGE UP (ref 13–17)
HIV-1 VIRAL LOAD RESULT: SIGNIFICANT CHANGE UP
HIV1 RNA # SERPL NAA+PROBE: SIGNIFICANT CHANGE UP COPIES/ML
HIV1 RNA SER-IMP: SIGNIFICANT CHANGE UP
HIV1 RNA SERPL NAA+PROBE-ACNC: SIGNIFICANT CHANGE UP
HIV1 RNA SERPL NAA+PROBE-LOG#: SIGNIFICANT CHANGE UP LG COP/ML
HSV+VZV DNA SPEC QL NAA+PROBE: ABNORMAL
KETONES UR-MCNC: NEGATIVE MG/DL — SIGNIFICANT CHANGE UP
LEUKOCYTE ESTERASE UR-ACNC: NEGATIVE — SIGNIFICANT CHANGE UP
LIPID PNL WITH DIRECT LDL SERPL: 130 MG/DL — HIGH
MCHC RBC-ENTMCNC: 30.3 PG — SIGNIFICANT CHANGE UP (ref 27–34)
MCHC RBC-ENTMCNC: 33 GM/DL — SIGNIFICANT CHANGE UP (ref 32–36)
MCV RBC AUTO: 91.7 FL — SIGNIFICANT CHANGE UP (ref 80–100)
N GONORRHOEA RRNA SPEC QL NAA+PROBE: SIGNIFICANT CHANGE UP
NITRITE UR-MCNC: NEGATIVE — SIGNIFICANT CHANGE UP
NON HDL CHOLESTEROL: 160 MG/DL — HIGH
PH UR: 7 — SIGNIFICANT CHANGE UP (ref 5–8)
PLATELET # BLD AUTO: 213 K/UL — SIGNIFICANT CHANGE UP (ref 150–400)
POTASSIUM SERPL-MCNC: 4.2 MMOL/L — SIGNIFICANT CHANGE UP (ref 3.5–5.3)
POTASSIUM SERPL-SCNC: 4.2 MMOL/L — SIGNIFICANT CHANGE UP (ref 3.5–5.3)
PROT SERPL-MCNC: 7.7 G/DL — SIGNIFICANT CHANGE UP (ref 6–8.3)
PROT UR-MCNC: NEGATIVE MG/DL — SIGNIFICANT CHANGE UP
PSA FLD-MCNC: 0.74 NG/ML — SIGNIFICANT CHANGE UP (ref 0–4)
RBC # BLD: 4.95 M/UL — SIGNIFICANT CHANGE UP (ref 4.2–5.8)
RBC # FLD: 14.1 % — SIGNIFICANT CHANGE UP (ref 10.3–14.5)
RBC CASTS # UR COMP ASSIST: 6 /HPF — HIGH (ref 0–4)
REVIEW: SIGNIFICANT CHANGE UP
SODIUM SERPL-SCNC: 140 MMOL/L — SIGNIFICANT CHANGE UP (ref 135–145)
SP GR SPEC: 1.02 — SIGNIFICANT CHANGE UP (ref 1–1.03)
SPECIMEN SOURCE: SIGNIFICANT CHANGE UP
SPECIMEN SOURCE: SIGNIFICANT CHANGE UP
SQUAMOUS # UR AUTO: 0 /HPF — SIGNIFICANT CHANGE UP (ref 0–5)
T PALLIDUM AB TITR SER: NEGATIVE — SIGNIFICANT CHANGE UP
TRIGL SERPL-MCNC: 170 MG/DL — HIGH
UROBILINOGEN FLD QL: 1 MG/DL — SIGNIFICANT CHANGE UP (ref 0.2–1)
WBC # BLD: 5.4 K/UL — SIGNIFICANT CHANGE UP (ref 3.8–10.5)
WBC # FLD AUTO: 5.4 K/UL — SIGNIFICANT CHANGE UP (ref 3.8–10.5)
WBC UR QL: 0 /HPF — SIGNIFICANT CHANGE UP (ref 0–5)

## 2024-07-21 DIAGNOSIS — M79.673 PAIN IN UNSPECIFIED FOOT: ICD-10-CM

## 2024-07-21 DIAGNOSIS — I10 ESSENTIAL (PRIMARY) HYPERTENSION: ICD-10-CM

## 2024-07-21 DIAGNOSIS — S00.422A: ICD-10-CM

## 2024-07-22 DIAGNOSIS — E78.2 MIXED HYPERLIPIDEMIA: ICD-10-CM

## 2024-07-22 DIAGNOSIS — I10 ESSENTIAL (PRIMARY) HYPERTENSION: ICD-10-CM

## 2024-07-22 RX ORDER — ROSUVASTATIN CALCIUM 5 MG/1
5 TABLET, FILM COATED ORAL
Qty: 15 | Refills: 2 | Status: ACTIVE | COMMUNITY
Start: 2024-07-22

## 2024-07-25 ENCOUNTER — RX RENEWAL (OUTPATIENT)
Age: 54
End: 2024-07-25

## 2024-08-01 DIAGNOSIS — Z01.20 ENCOUNTER FOR DENTAL EXAMINATION AND CLEANING WITHOUT ABNORMAL FINDINGS: ICD-10-CM

## 2024-08-14 ENCOUNTER — APPOINTMENT (OUTPATIENT)
Dept: PODIATRY | Facility: HOSPITAL | Age: 54
End: 2024-08-14
Payer: MEDICARE

## 2024-08-14 VITALS
RESPIRATION RATE: 16 BRPM | TEMPERATURE: 97.3 F | OXYGEN SATURATION: 98 % | HEART RATE: 84 BPM | WEIGHT: 176 LBS | DIASTOLIC BLOOD PRESSURE: 86 MMHG | SYSTOLIC BLOOD PRESSURE: 128 MMHG | HEIGHT: 68 IN | BODY MASS INDEX: 26.67 KG/M2

## 2024-08-14 DIAGNOSIS — M79.673 PAIN IN UNSPECIFIED FOOT: ICD-10-CM

## 2024-08-14 DIAGNOSIS — M72.2 PLANTAR FASCIAL FIBROMATOSIS: ICD-10-CM

## 2024-08-14 PROCEDURE — 99203 OFFICE O/P NEW LOW 30 MIN: CPT

## 2024-08-14 RX ORDER — MELOXICAM 15 MG/1
15 TABLET ORAL
Qty: 14 | Refills: 0 | Status: ACTIVE | COMMUNITY
Start: 2024-08-14 | End: 1900-01-01

## 2024-08-14 NOTE — PHYSICAL EXAM
[2+] : left foot dorsalis pedis 2+ [Skin Color & Pigmentation] : normal skin color and pigmentation [Skin Turgor] : normal skin turgor [] : no rash [Skin Lesions] : no skin lesions [Oriented To Time, Place, And Person] : oriented to person, place, and time [Diminished Throughout Right Foot] : normal sensation with monofilament testing throughout right foot [Diminished Throughout Left Foot] : normal sensation with monofilament testing throughout left foot

## 2024-08-14 NOTE — HISTORY OF PRESENT ILLNESS
[FreeTextEntry1] : 54M presents to clinic for left heel pain. Patient states that pain started about 6 months ago. Pain comes and goes. Patient describes pain as sharp and worse in the morning. Patient rated the pain 6 out of 10 on VAS pain scale. Patient has not taken any pain medication. Patient typically wears [employer] sneakers. Patient denies fever/ cough/ nausea/ vomiting/ chills/ shortness of breath.

## 2024-08-14 NOTE — ASSESSMENT
[FreeTextEntry1] : 54M presents with left heel pain  - Patient seen and evaluated  - Afebrile  - Pain upon palpation of left heel. No open lesions or acute signs of infection of bilateral foot.  - Ordered left foot xray - Rx Meloxicam - Educated and demonstrated stretching exercises for plantar fasciitis  - Recommend good suppportive shoegear and powerstep over the counter inserts - Will consider injection if pain not improve next visit  - Patient to return in 2 weeks after having xrays completed

## 2024-08-19 ENCOUNTER — RX RENEWAL (OUTPATIENT)
Age: 54
End: 2024-08-19

## 2024-08-21 ENCOUNTER — NON-APPOINTMENT (OUTPATIENT)
Age: 54
End: 2024-08-21

## 2024-08-28 ENCOUNTER — OUTPATIENT (OUTPATIENT)
Dept: OUTPATIENT SERVICES | Facility: HOSPITAL | Age: 54
LOS: 1 days | End: 2024-08-28
Payer: MEDICARE

## 2024-08-28 ENCOUNTER — RESULT REVIEW (OUTPATIENT)
Age: 54
End: 2024-08-28

## 2024-08-28 ENCOUNTER — APPOINTMENT (OUTPATIENT)
Dept: PODIATRY | Facility: HOSPITAL | Age: 54
End: 2024-08-28
Payer: MEDICARE

## 2024-08-28 VITALS
DIASTOLIC BLOOD PRESSURE: 85 MMHG | SYSTOLIC BLOOD PRESSURE: 126 MMHG | TEMPERATURE: 97.3 F | OXYGEN SATURATION: 97 % | BODY MASS INDEX: 26.98 KG/M2 | WEIGHT: 178 LBS | HEIGHT: 68 IN | HEART RATE: 89 BPM

## 2024-08-28 VITALS
OXYGEN SATURATION: 100 % | HEART RATE: 72 BPM | HEIGHT: 68 IN | TEMPERATURE: 98 F | SYSTOLIC BLOOD PRESSURE: 138 MMHG | RESPIRATION RATE: 14 BRPM | BODY MASS INDEX: 26.67 KG/M2 | WEIGHT: 176 LBS | DIASTOLIC BLOOD PRESSURE: 89 MMHG

## 2024-08-28 DIAGNOSIS — M79.673 PAIN IN UNSPECIFIED FOOT: ICD-10-CM

## 2024-08-28 DIAGNOSIS — M72.2 PLANTAR FASCIAL FIBROMATOSIS: ICD-10-CM

## 2024-08-28 DIAGNOSIS — Z90.49 ACQUIRED ABSENCE OF OTHER SPECIFIED PARTS OF DIGESTIVE TRACT: Chronic | ICD-10-CM

## 2024-08-28 DIAGNOSIS — M79.609 PAIN IN UNSPECIFIED LIMB: ICD-10-CM

## 2024-08-28 PROCEDURE — 99213 OFFICE O/P EST LOW 20 MIN: CPT

## 2024-08-28 PROCEDURE — 73630 X-RAY EXAM OF FOOT: CPT | Mod: 26,LT

## 2024-08-28 PROCEDURE — G0463: CPT

## 2024-08-28 PROCEDURE — 73630 X-RAY EXAM OF FOOT: CPT

## 2024-08-28 NOTE — ASSESSMENT
[FreeTextEntry1] : 54M presents with left plantar fasciitis. - Patient seen and evaluated. - Left heel pain on palpation of the medial band. - Left Foot X-Rays: Reviewed with plantar heel spur noted. - Recommend no left heel plantar fascia injection today 2/2 possibility of rupturing the fascia while hiking in 3 days and the inability to rest the left foot. - Recommend taking Meloxicam according to the instructions daily for the next 2 weeks. - Recommend continuing daily stretching exercises. - Recommend continuing to wear supportive shoegear and powerstep over the counter inserts. - RTC in 2 weeks for possible injection if pain does not improve.

## 2024-08-28 NOTE — HISTORY OF PRESENT ILLNESS
[FreeTextEntry1] : 54M presents to clinic for left heel pain. States that pain started about 6 months ago. Pain comes and goes. Patient describes pain as sharp and worse in the morning. Patient rated the pain 7 out of 10 on VAS pain scale at its worst. He as not taken any of the Meloxicam. Reports performing the stretching exercises daily. Pt has a hiking trip to [place] planned in 3 days. Denies N/V/F/C/SOB.

## 2024-08-28 NOTE — PHYSICAL EXAM
[2+] : left foot dorsalis pedis 2+ [Skin Color & Pigmentation] : normal skin color and pigmentation [] : no rash [Skin Turgor] : normal skin turgor [Skin Lesions] : no skin lesions [Oriented To Time, Place, And Person] : oriented to person, place, and time [de-identified] : Left heel pain on palpation of the medial band. [Diminished Throughout Right Foot] : normal sensation with monofilament testing throughout right foot [Diminished Throughout Left Foot] : normal sensation with monofilament testing throughout left foot

## 2024-09-18 ENCOUNTER — OUTPATIENT (OUTPATIENT)
Dept: OUTPATIENT SERVICES | Facility: HOSPITAL | Age: 54
LOS: 1 days | End: 2024-09-18
Payer: MEDICARE

## 2024-09-18 ENCOUNTER — RX RENEWAL (OUTPATIENT)
Age: 54
End: 2024-09-18

## 2024-09-18 ENCOUNTER — APPOINTMENT (OUTPATIENT)
Dept: PODIATRY | Facility: HOSPITAL | Age: 54
End: 2024-09-18
Payer: MEDICARE

## 2024-09-18 VITALS
OXYGEN SATURATION: 98 % | TEMPERATURE: 97.1 F | HEART RATE: 80 BPM | RESPIRATION RATE: 14 BRPM | DIASTOLIC BLOOD PRESSURE: 84 MMHG | SYSTOLIC BLOOD PRESSURE: 116 MMHG

## 2024-09-18 DIAGNOSIS — M79.609 PAIN IN UNSPECIFIED LIMB: ICD-10-CM

## 2024-09-18 DIAGNOSIS — Z98.890 OTHER SPECIFIED POSTPROCEDURAL STATES: Chronic | ICD-10-CM

## 2024-09-18 DIAGNOSIS — M79.673 PAIN IN UNSPECIFIED FOOT: ICD-10-CM

## 2024-09-18 DIAGNOSIS — Z90.49 ACQUIRED ABSENCE OF OTHER SPECIFIED PARTS OF DIGESTIVE TRACT: Chronic | ICD-10-CM

## 2024-09-18 DIAGNOSIS — M72.2 PLANTAR FASCIAL FIBROMATOSIS: ICD-10-CM

## 2024-09-18 PROCEDURE — 99213 OFFICE O/P EST LOW 20 MIN: CPT

## 2024-09-18 PROCEDURE — G0463: CPT

## 2024-09-18 NOTE — HISTORY OF PRESENT ILLNESS
[FreeTextEntry1] : 54M presents to clinic for left heel pain. States that pain started about 6 months ago. Pain comes and goes. Patient describes pain as sharp and worse in the morning. Patient rated the pain 7 out of 10 on VAS pain scale at its worst. He as not taken any of the Meloxicam. Reports performing the stretching exercises daily. Pt has a hiking trip to [place] planned in 3 days. Denies N/V/F/C/SOB.

## 2024-09-18 NOTE — PHYSICAL EXAM
[2+] : left foot dorsalis pedis 2+ [Skin Color & Pigmentation] : normal skin color and pigmentation [Skin Turgor] : normal skin turgor [] : no rash [Skin Lesions] : no skin lesions [Oriented To Time, Place, And Person] : oriented to person, place, and time [de-identified] : Left heel pain on palpation of the medial band. [Diminished Throughout Right Foot] : normal sensation with monofilament testing throughout right foot [Diminished Throughout Left Foot] : normal sensation with monofilament testing throughout left foot

## 2024-09-19 DIAGNOSIS — M72.2 PLANTAR FASCIAL FIBROMATOSIS: ICD-10-CM

## 2024-09-19 DIAGNOSIS — M79.673 PAIN IN UNSPECIFIED FOOT: ICD-10-CM

## 2024-10-07 ENCOUNTER — NON-APPOINTMENT (OUTPATIENT)
Age: 54
End: 2024-10-07

## 2024-10-09 ENCOUNTER — APPOINTMENT (OUTPATIENT)
Dept: PODIATRY | Facility: HOSPITAL | Age: 54
End: 2024-10-09

## 2024-10-14 ENCOUNTER — RX RENEWAL (OUTPATIENT)
Age: 54
End: 2024-10-14

## 2024-11-11 ENCOUNTER — NON-APPOINTMENT (OUTPATIENT)
Age: 54
End: 2024-11-11

## 2024-11-11 ENCOUNTER — APPOINTMENT (OUTPATIENT)
Dept: INFECTIOUS DISEASE | Facility: CLINIC | Age: 54
End: 2024-11-11
Payer: COMMERCIAL

## 2024-11-11 ENCOUNTER — OUTPATIENT (OUTPATIENT)
Dept: OUTPATIENT SERVICES | Facility: HOSPITAL | Age: 54
LOS: 1 days | End: 2024-11-11
Payer: COMMERCIAL

## 2024-11-11 VITALS
TEMPERATURE: 97.6 F | SYSTOLIC BLOOD PRESSURE: 123 MMHG | BODY MASS INDEX: 26.67 KG/M2 | HEIGHT: 68 IN | HEART RATE: 89 BPM | OXYGEN SATURATION: 98 % | DIASTOLIC BLOOD PRESSURE: 85 MMHG | WEIGHT: 176 LBS

## 2024-11-11 DIAGNOSIS — B20 HUMAN IMMUNODEFICIENCY VIRUS [HIV] DISEASE: ICD-10-CM

## 2024-11-11 DIAGNOSIS — Z98.890 OTHER SPECIFIED POSTPROCEDURAL STATES: Chronic | ICD-10-CM

## 2024-11-11 DIAGNOSIS — Z90.49 ACQUIRED ABSENCE OF OTHER SPECIFIED PARTS OF DIGESTIVE TRACT: Chronic | ICD-10-CM

## 2024-11-11 PROCEDURE — 99214 OFFICE O/P EST MOD 30 MIN: CPT

## 2024-11-11 PROCEDURE — G0463: CPT

## 2024-11-12 LAB
25(OH)D3 SERPL-MCNC: 43.3 NG/ML
ALBUMIN SERPL ELPH-MCNC: 4.3 G/DL
ALP BLD-CCNC: 69 U/L
ALT SERPL-CCNC: 11 U/L
ANION GAP SERPL CALC-SCNC: 13 MMOL/L
APPEARANCE: CLEAR
AST SERPL-CCNC: 20 U/L
BACTERIA: NEGATIVE /HPF
BILIRUB SERPL-MCNC: 0.6 MG/DL
BILIRUBIN URINE: NEGATIVE
BLOOD URINE: NEGATIVE
BUN SERPL-MCNC: 13 MG/DL
C TRACH RRNA SPEC QL NAA+PROBE: NOT DETECTED
CALCIUM SERPL-MCNC: 9 MG/DL
CAST: 0 /LPF
CD3 CELLS # BLD: 1175 CELLS/UL
CD3 CELLS NFR BLD: 69 %
CD3+CD4+ CELLS # BLD: 619 CELLS/UL
CD3+CD4+ CELLS NFR BLD: 37 %
CD3+CD4+ CELLS/CD3+CD8+ CLL SPEC: 1.19 RATIO
CD3+CD8+ CELLS # SPEC: 519 CELLS/UL
CD3+CD8+ CELLS NFR BLD: 31 %
CHLORIDE SERPL-SCNC: 102 MMOL/L
CO2 SERPL-SCNC: 22 MMOL/L
COLOR: YELLOW
CREAT SERPL-MCNC: 1.04 MG/DL
EGFR: 85 ML/MIN/1.73M2
EPITHELIAL CELLS: 1 /HPF
GLUCOSE QUALITATIVE U: NEGATIVE MG/DL
GLUCOSE SERPL-MCNC: 123 MG/DL
HCT VFR BLD CALC: 41.4 %
HGB BLD-MCNC: 14 G/DL
KETONES URINE: NEGATIVE MG/DL
LEUKOCYTE ESTERASE URINE: ABNORMAL
MCHC RBC-ENTMCNC: 29.9 PG
MCHC RBC-ENTMCNC: 33.8 G/DL
MCV RBC AUTO: 88.3 FL
MICROSCOPIC-UA: NORMAL
N GONORRHOEA RRNA SPEC QL NAA+PROBE: NOT DETECTED
NITRITE URINE: NEGATIVE
PH URINE: 6
PLATELET # BLD AUTO: 196 K/UL
POTASSIUM SERPL-SCNC: 3.8 MMOL/L
PROT SERPL-MCNC: 7.3 G/DL
PROTEIN URINE: NEGATIVE MG/DL
PSA SERPL-MCNC: 2 NG/ML
RBC # BLD: 4.69 M/UL
RBC # FLD: 12.9 %
RED BLOOD CELLS URINE: 4 /HPF
SODIUM SERPL-SCNC: 137 MMOL/L
SOURCE AMPLIFICATION: NORMAL
SOURCE AMPLIFICATION: NORMAL
SPECIFIC GRAVITY URINE: 1.02
T PALLIDUM AB SER QL IA: NEGATIVE
T VAGINALIS RRNA SPEC QL NAA+PROBE: NOT DETECTED
UROBILINOGEN URINE: 0.2 MG/DL
WBC # FLD AUTO: 5.35 K/UL
WHITE BLOOD CELLS URINE: 8 /HPF

## 2024-11-14 ENCOUNTER — NON-APPOINTMENT (OUTPATIENT)
Age: 54
End: 2024-11-14

## 2024-11-14 DIAGNOSIS — R30.9 PAINFUL MICTURITION, UNSPECIFIED: ICD-10-CM

## 2024-11-14 LAB
ANAL PAP CYTOLOGY: NORMAL
HIV1 RNA # SERPL NAA+PROBE: NORMAL
HIV1 RNA # SERPL NAA+PROBE: NORMAL COPIES/ML
VIRAL LOAD INTERP: NORMAL
VIRAL LOAD LOG: NORMAL LG COP/ML

## 2024-11-15 LAB — HPV DNA, HIGH RISK, LOW RISK, ANAL: NOT DETECTED

## 2024-11-17 LAB
MYCOPLASMA HOMINIS CULTURE: NEGATIVE
UREAPLASMA CULTURE: NEGATIVE

## 2024-12-09 ENCOUNTER — OUTPATIENT (OUTPATIENT)
Dept: OUTPATIENT SERVICES | Facility: HOSPITAL | Age: 54
LOS: 1 days | End: 2024-12-09
Payer: COMMERCIAL

## 2024-12-09 ENCOUNTER — APPOINTMENT (OUTPATIENT)
Age: 54
End: 2024-12-09
Payer: COMMERCIAL

## 2024-12-09 ENCOUNTER — RX RENEWAL (OUTPATIENT)
Age: 54
End: 2024-12-09

## 2024-12-09 DIAGNOSIS — Z98.890 OTHER SPECIFIED POSTPROCEDURAL STATES: Chronic | ICD-10-CM

## 2024-12-09 DIAGNOSIS — Z90.49 ACQUIRED ABSENCE OF OTHER SPECIFIED PARTS OF DIGESTIVE TRACT: Chronic | ICD-10-CM

## 2024-12-09 PROCEDURE — ZZZZZ: CPT

## 2024-12-09 PROCEDURE — D0120: CPT

## 2024-12-09 PROCEDURE — D1110: CPT

## 2024-12-09 PROCEDURE — D0274: CPT

## 2024-12-18 ENCOUNTER — NON-APPOINTMENT (OUTPATIENT)
Age: 54
End: 2024-12-18

## 2024-12-18 ENCOUNTER — APPOINTMENT (OUTPATIENT)
Dept: CARDIOLOGY | Facility: HOSPITAL | Age: 54
End: 2024-12-18

## 2024-12-18 ENCOUNTER — OUTPATIENT (OUTPATIENT)
Dept: OUTPATIENT SERVICES | Facility: HOSPITAL | Age: 54
LOS: 1 days | End: 2024-12-18
Payer: COMMERCIAL

## 2024-12-18 VITALS
OXYGEN SATURATION: 97 % | SYSTOLIC BLOOD PRESSURE: 127 MMHG | HEART RATE: 90 BPM | DIASTOLIC BLOOD PRESSURE: 87 MMHG | WEIGHT: 176 LBS | BODY MASS INDEX: 26.67 KG/M2 | HEIGHT: 68 IN

## 2024-12-18 DIAGNOSIS — E78.2 MIXED HYPERLIPIDEMIA: ICD-10-CM

## 2024-12-18 DIAGNOSIS — I10 ESSENTIAL (PRIMARY) HYPERTENSION: ICD-10-CM

## 2024-12-18 DIAGNOSIS — Z90.49 ACQUIRED ABSENCE OF OTHER SPECIFIED PARTS OF DIGESTIVE TRACT: Chronic | ICD-10-CM

## 2024-12-18 DIAGNOSIS — I25.10 ATHEROSCLEROTIC HEART DISEASE OF NATIVE CORONARY ARTERY WITHOUT ANGINA PECTORIS: ICD-10-CM

## 2024-12-18 DIAGNOSIS — Z98.890 OTHER SPECIFIED POSTPROCEDURAL STATES: Chronic | ICD-10-CM

## 2024-12-18 PROCEDURE — G0463: CPT

## 2024-12-18 PROCEDURE — 93005 ELECTROCARDIOGRAM TRACING: CPT

## 2024-12-20 DIAGNOSIS — E78.2 MIXED HYPERLIPIDEMIA: ICD-10-CM

## 2024-12-20 DIAGNOSIS — I10 ESSENTIAL (PRIMARY) HYPERTENSION: ICD-10-CM

## 2024-12-20 LAB
CHOLEST SERPL-MCNC: 159 MG/DL
HDLC SERPL-MCNC: 47 MG/DL
LDLC SERPL CALC-MCNC: 88 MG/DL
NONHDLC SERPL-MCNC: 112 MG/DL
TRIGL SERPL-MCNC: 136 MG/DL

## 2024-12-30 ENCOUNTER — RX RENEWAL (OUTPATIENT)
Age: 54
End: 2024-12-30

## 2025-01-06 ENCOUNTER — RX RENEWAL (OUTPATIENT)
Age: 55
End: 2025-01-06

## 2025-01-10 ENCOUNTER — NON-APPOINTMENT (OUTPATIENT)
Age: 55
End: 2025-01-10

## 2025-02-03 ENCOUNTER — RX RENEWAL (OUTPATIENT)
Age: 55
End: 2025-02-03

## 2025-02-06 ENCOUNTER — LABORATORY RESULT (OUTPATIENT)
Age: 55
End: 2025-02-06

## 2025-02-06 ENCOUNTER — OUTPATIENT (OUTPATIENT)
Dept: OUTPATIENT SERVICES | Facility: HOSPITAL | Age: 55
LOS: 1 days | End: 2025-02-06
Payer: COMMERCIAL

## 2025-02-06 ENCOUNTER — APPOINTMENT (OUTPATIENT)
Dept: INFECTIOUS DISEASE | Facility: CLINIC | Age: 55
End: 2025-02-06
Payer: COMMERCIAL

## 2025-02-06 VITALS
OXYGEN SATURATION: 99 % | SYSTOLIC BLOOD PRESSURE: 121 MMHG | WEIGHT: 174 LBS | DIASTOLIC BLOOD PRESSURE: 79 MMHG | HEIGHT: 68 IN | TEMPERATURE: 97.9 F | HEART RATE: 75 BPM | BODY MASS INDEX: 26.37 KG/M2

## 2025-02-06 DIAGNOSIS — R19.7 DIARRHEA, UNSPECIFIED: ICD-10-CM

## 2025-02-06 DIAGNOSIS — Z98.890 OTHER SPECIFIED POSTPROCEDURAL STATES: Chronic | ICD-10-CM

## 2025-02-06 DIAGNOSIS — Z01.20 ENCOUNTER FOR DENTAL EXAMINATION AND CLEANING W/OUT ABNORMAL FINDINGS: ICD-10-CM

## 2025-02-06 DIAGNOSIS — Z01.00 ENCOUNTER FOR EXAMINATION OF EYES AND VISION W/OUT ABNORMAL FINDINGS: ICD-10-CM

## 2025-02-06 DIAGNOSIS — Z90.49 ACQUIRED ABSENCE OF OTHER SPECIFIED PARTS OF DIGESTIVE TRACT: Chronic | ICD-10-CM

## 2025-02-06 DIAGNOSIS — B00.9 HERPESVIRAL INFECTION, UNSPECIFIED: ICD-10-CM

## 2025-02-06 DIAGNOSIS — I10 ESSENTIAL (PRIMARY) HYPERTENSION: ICD-10-CM

## 2025-02-06 DIAGNOSIS — B20 HUMAN IMMUNODEFICIENCY VIRUS [HIV] DISEASE: ICD-10-CM

## 2025-02-06 LAB
24R-OH-CALCIDIOL SERPL-MCNC: 40.7 NG/ML — SIGNIFICANT CHANGE UP (ref 30–80)
4/8 RATIO: 1.49 RATIO — SIGNIFICANT CHANGE UP (ref 0.9–3.6)
A1C WITH ESTIMATED AVERAGE GLUCOSE RESULT: 5.5 % — SIGNIFICANT CHANGE UP (ref 4–5.6)
ABS CD8: 414 CELLS/UL — SIGNIFICANT CHANGE UP (ref 142–740)
AFP-TM SERPL-MCNC: 2.6 NG/ML — SIGNIFICANT CHANGE UP
ALBUMIN SERPL ELPH-MCNC: 4.3 G/DL — SIGNIFICANT CHANGE UP (ref 3.3–5)
ALP SERPL-CCNC: 73 U/L — SIGNIFICANT CHANGE UP (ref 40–120)
ALT FLD-CCNC: 39 U/L — SIGNIFICANT CHANGE UP (ref 10–45)
ANION GAP SERPL CALC-SCNC: 12 MMOL/L — SIGNIFICANT CHANGE UP (ref 5–17)
APPEARANCE UR: CLEAR — SIGNIFICANT CHANGE UP
AST SERPL-CCNC: 35 U/L — SIGNIFICANT CHANGE UP (ref 10–40)
BACTERIA # UR AUTO: NEGATIVE /HPF — SIGNIFICANT CHANGE UP
BILIRUB SERPL-MCNC: 0.5 MG/DL — SIGNIFICANT CHANGE UP (ref 0.2–1.2)
BILIRUB UR-MCNC: NEGATIVE — SIGNIFICANT CHANGE UP
BUN SERPL-MCNC: 7 MG/DL — SIGNIFICANT CHANGE UP (ref 7–23)
CALCIUM SERPL-MCNC: 9.1 MG/DL — SIGNIFICANT CHANGE UP (ref 8.4–10.5)
CAST: 0 /LPF — SIGNIFICANT CHANGE UP (ref 0–4)
CD3 BLASTS SPEC-ACNC: 1061 CELLS/UL — SIGNIFICANT CHANGE UP (ref 672–1870)
CD3 BLASTS SPEC-ACNC: 84 % — HIGH (ref 59–83)
CD4 %: 49 % — SIGNIFICANT CHANGE UP (ref 30–62)
CD8 %: 33 % — SIGNIFICANT CHANGE UP (ref 12–36)
CHLORIDE SERPL-SCNC: 104 MMOL/L — SIGNIFICANT CHANGE UP (ref 96–108)
CHOLEST SERPL-MCNC: 110 MG/DL — SIGNIFICANT CHANGE UP
CO2 SERPL-SCNC: 26 MMOL/L — SIGNIFICANT CHANGE UP (ref 22–31)
COLOR SPEC: YELLOW — SIGNIFICANT CHANGE UP
CREAT SERPL-MCNC: 1.03 MG/DL — SIGNIFICANT CHANGE UP (ref 0.5–1.3)
DIFF PNL FLD: NEGATIVE — SIGNIFICANT CHANGE UP
EGFR: 86 ML/MIN/1.73M2 — SIGNIFICANT CHANGE UP
ESTIMATED AVERAGE GLUCOSE: 111 MG/DL — SIGNIFICANT CHANGE UP (ref 68–114)
GLUCOSE SERPL-MCNC: 80 MG/DL — SIGNIFICANT CHANGE UP (ref 70–99)
GLUCOSE UR QL: NEGATIVE MG/DL — SIGNIFICANT CHANGE UP
HCT VFR BLD CALC: 42.8 % — SIGNIFICANT CHANGE UP (ref 39–50)
HDLC SERPL-MCNC: 43 MG/DL — SIGNIFICANT CHANGE UP
HGB BLD-MCNC: 14.5 G/DL — SIGNIFICANT CHANGE UP (ref 13–17)
KETONES UR-MCNC: NEGATIVE MG/DL — SIGNIFICANT CHANGE UP
LEUKOCYTE ESTERASE UR-ACNC: NEGATIVE — SIGNIFICANT CHANGE UP
LIPID PNL WITH DIRECT LDL SERPL: 45 MG/DL — SIGNIFICANT CHANGE UP
MCHC RBC-ENTMCNC: 29.8 PG — SIGNIFICANT CHANGE UP (ref 27–34)
MCHC RBC-ENTMCNC: 33.9 G/DL — SIGNIFICANT CHANGE UP (ref 32–36)
MCV RBC AUTO: 88.1 FL — SIGNIFICANT CHANGE UP (ref 80–100)
NITRITE UR-MCNC: NEGATIVE — SIGNIFICANT CHANGE UP
NON HDL CHOLESTEROL: 67 MG/DL — SIGNIFICANT CHANGE UP
PH UR: 7.5 — SIGNIFICANT CHANGE UP (ref 5–8)
PLATELET # BLD AUTO: 252 K/UL — SIGNIFICANT CHANGE UP (ref 150–400)
POTASSIUM SERPL-MCNC: 3.7 MMOL/L — SIGNIFICANT CHANGE UP (ref 3.5–5.3)
POTASSIUM SERPL-SCNC: 3.7 MMOL/L — SIGNIFICANT CHANGE UP (ref 3.5–5.3)
PROT SERPL-MCNC: 7.6 G/DL — SIGNIFICANT CHANGE UP (ref 6–8.3)
PROT UR-MCNC: NEGATIVE MG/DL — SIGNIFICANT CHANGE UP
PSA FLD-MCNC: 0.71 NG/ML — SIGNIFICANT CHANGE UP (ref 0–4)
RBC # BLD: 4.86 M/UL — SIGNIFICANT CHANGE UP (ref 4.2–5.8)
RBC # FLD: 12.8 % — SIGNIFICANT CHANGE UP (ref 10.3–14.5)
RBC CASTS # UR COMP ASSIST: 5 /HPF — HIGH (ref 0–4)
SODIUM SERPL-SCNC: 141 MMOL/L — SIGNIFICANT CHANGE UP (ref 135–145)
SP GR SPEC: 1.02 — SIGNIFICANT CHANGE UP (ref 1–1.03)
SQUAMOUS # UR AUTO: 0 /HPF — SIGNIFICANT CHANGE UP (ref 0–5)
T PALLIDUM AB TITR SER: NEGATIVE — SIGNIFICANT CHANGE UP
T-CELL CD4 SUBSET PNL BLD: 618 CELLS/UL — SIGNIFICANT CHANGE UP (ref 489–1457)
TRIGL SERPL-MCNC: 121 MG/DL — SIGNIFICANT CHANGE UP
UROBILINOGEN FLD QL: 0.2 MG/DL — SIGNIFICANT CHANGE UP (ref 0.2–1)
WBC # BLD: 5.88 K/UL — SIGNIFICANT CHANGE UP (ref 3.8–10.5)
WBC # FLD AUTO: 5.88 K/UL — SIGNIFICANT CHANGE UP (ref 3.8–10.5)
WBC UR QL: 0 /HPF — SIGNIFICANT CHANGE UP (ref 0–5)

## 2025-02-06 PROCEDURE — 82306 VITAMIN D 25 HYDROXY: CPT

## 2025-02-06 PROCEDURE — 80053 COMPREHEN METABOLIC PANEL: CPT

## 2025-02-06 PROCEDURE — 87591 N.GONORRHOEAE DNA AMP PROB: CPT

## 2025-02-06 PROCEDURE — 86753 PROTOZOA ANTIBODY NOS: CPT

## 2025-02-06 PROCEDURE — 81001 URINALYSIS AUTO W/SCOPE: CPT

## 2025-02-06 PROCEDURE — 86359 T CELLS TOTAL COUNT: CPT

## 2025-02-06 PROCEDURE — 86360 T CELL ABSOLUTE COUNT/RATIO: CPT

## 2025-02-06 PROCEDURE — 85027 COMPLETE CBC AUTOMATED: CPT

## 2025-02-06 PROCEDURE — 86780 TREPONEMA PALLIDUM: CPT

## 2025-02-06 PROCEDURE — 83036 HEMOGLOBIN GLYCOSYLATED A1C: CPT

## 2025-02-06 PROCEDURE — G0103: CPT

## 2025-02-06 PROCEDURE — 82105 ALPHA-FETOPROTEIN SERUM: CPT

## 2025-02-06 PROCEDURE — 86803 HEPATITIS C AB TEST: CPT

## 2025-02-06 PROCEDURE — 86480 TB TEST CELL IMMUN MEASURE: CPT

## 2025-02-06 PROCEDURE — 99205 OFFICE O/P NEW HI 60 MIN: CPT

## 2025-02-06 PROCEDURE — 87491 CHLMYD TRACH DNA AMP PROBE: CPT

## 2025-02-06 PROCEDURE — G0463: CPT

## 2025-02-06 PROCEDURE — 80061 LIPID PANEL: CPT

## 2025-02-07 LAB
C TRACH RRNA SPEC QL NAA+PROBE: SIGNIFICANT CHANGE UP
HCV AB S/CO SERPL IA: 0.26 S/CO — SIGNIFICANT CHANGE UP (ref 0–0.99)
HCV AB SERPL-IMP: SIGNIFICANT CHANGE UP
N GONORRHOEA RRNA SPEC QL NAA+PROBE: SIGNIFICANT CHANGE UP
SPECIMEN SOURCE: SIGNIFICANT CHANGE UP

## 2025-02-08 PROBLEM — B00.9 HERPES SIMPLEX TYPE 2 INFECTION: Status: ACTIVE | Noted: 2025-02-08

## 2025-02-10 DIAGNOSIS — I10 ESSENTIAL (PRIMARY) HYPERTENSION: ICD-10-CM

## 2025-02-10 DIAGNOSIS — B00.9 HERPESVIRAL INFECTION, UNSPECIFIED: ICD-10-CM

## 2025-02-10 DIAGNOSIS — R19.7 DIARRHEA, UNSPECIFIED: ICD-10-CM

## 2025-02-10 LAB
GAMMA INTERFERON BACKGROUND BLD IA-ACNC: 0.03 IU/ML — SIGNIFICANT CHANGE UP
M TB IFN-G BLD-IMP: NEGATIVE — SIGNIFICANT CHANGE UP
M TB IFN-G CD4+ BCKGRND COR BLD-ACNC: 0 IU/ML — SIGNIFICANT CHANGE UP
M TB IFN-G CD4+CD8+ BCKGRND COR BLD-ACNC: 0 IU/ML — SIGNIFICANT CHANGE UP
QUANT TB PLUS MITOGEN MINUS NIL: 8.49 IU/ML — SIGNIFICANT CHANGE UP

## 2025-02-11 LAB — E HISTOLYT AB SER-ACNC: NEGATIVE — SIGNIFICANT CHANGE UP

## 2025-03-04 DIAGNOSIS — Z01.20 ENCOUNTER FOR DENTAL EXAMINATION AND CLEANING WITHOUT ABNORMAL FINDINGS: ICD-10-CM

## 2025-03-19 ENCOUNTER — APPOINTMENT (OUTPATIENT)
Dept: CARDIOLOGY | Facility: HOSPITAL | Age: 55
End: 2025-03-19

## 2025-03-22 ENCOUNTER — RX RENEWAL (OUTPATIENT)
Age: 55
End: 2025-03-22

## 2025-03-26 ENCOUNTER — APPOINTMENT (OUTPATIENT)
Dept: CARDIOLOGY | Facility: HOSPITAL | Age: 55
End: 2025-03-26

## 2025-03-26 ENCOUNTER — OUTPATIENT (OUTPATIENT)
Dept: OUTPATIENT SERVICES | Facility: HOSPITAL | Age: 55
LOS: 1 days | End: 2025-03-26
Payer: COMMERCIAL

## 2025-03-26 ENCOUNTER — NON-APPOINTMENT (OUTPATIENT)
Age: 55
End: 2025-03-26

## 2025-03-26 VITALS
OXYGEN SATURATION: 96 % | WEIGHT: 175 LBS | BODY MASS INDEX: 26.52 KG/M2 | SYSTOLIC BLOOD PRESSURE: 118 MMHG | HEART RATE: 84 BPM | HEIGHT: 68 IN | DIASTOLIC BLOOD PRESSURE: 77 MMHG

## 2025-03-26 DIAGNOSIS — E78.2 MIXED HYPERLIPIDEMIA: ICD-10-CM

## 2025-03-26 DIAGNOSIS — Z98.890 OTHER SPECIFIED POSTPROCEDURAL STATES: Chronic | ICD-10-CM

## 2025-03-26 DIAGNOSIS — I25.10 ATHEROSCLEROTIC HEART DISEASE OF NATIVE CORONARY ARTERY WITHOUT ANGINA PECTORIS: ICD-10-CM

## 2025-03-26 DIAGNOSIS — Z90.49 ACQUIRED ABSENCE OF OTHER SPECIFIED PARTS OF DIGESTIVE TRACT: Chronic | ICD-10-CM

## 2025-03-26 DIAGNOSIS — I10 ESSENTIAL (PRIMARY) HYPERTENSION: ICD-10-CM

## 2025-03-26 PROCEDURE — 93005 ELECTROCARDIOGRAM TRACING: CPT

## 2025-03-26 PROCEDURE — G0463: CPT

## 2025-03-28 DIAGNOSIS — E78.2 MIXED HYPERLIPIDEMIA: ICD-10-CM

## 2025-03-28 DIAGNOSIS — I10 ESSENTIAL (PRIMARY) HYPERTENSION: ICD-10-CM

## 2025-03-31 ENCOUNTER — RX RENEWAL (OUTPATIENT)
Age: 55
End: 2025-03-31

## 2025-05-12 ENCOUNTER — RX RENEWAL (OUTPATIENT)
Age: 55
End: 2025-05-12

## 2025-06-13 ENCOUNTER — OUTPATIENT (OUTPATIENT)
Dept: OUTPATIENT SERVICES | Facility: HOSPITAL | Age: 55
LOS: 1 days | End: 2025-06-13
Payer: COMMERCIAL

## 2025-06-13 ENCOUNTER — APPOINTMENT (OUTPATIENT)
Age: 55
End: 2025-06-13

## 2025-06-13 DIAGNOSIS — Z90.49 ACQUIRED ABSENCE OF OTHER SPECIFIED PARTS OF DIGESTIVE TRACT: Chronic | ICD-10-CM

## 2025-06-13 DIAGNOSIS — Z98.890 OTHER SPECIFIED POSTPROCEDURAL STATES: Chronic | ICD-10-CM

## 2025-06-13 PROCEDURE — D0120: CPT

## 2025-06-13 PROCEDURE — ZZZZZ: CPT

## 2025-06-13 PROCEDURE — D1110: CPT

## 2025-06-13 PROCEDURE — D0220: CPT

## 2025-06-13 PROCEDURE — D0270: CPT

## 2025-07-05 ENCOUNTER — RX RENEWAL (OUTPATIENT)
Age: 55
End: 2025-07-05

## 2025-07-11 ENCOUNTER — APPOINTMENT (OUTPATIENT)
Age: 55
End: 2025-07-11

## 2025-07-11 PROCEDURE — ZZZZZ: CPT

## 2025-08-01 ENCOUNTER — APPOINTMENT (OUTPATIENT)
Age: 55
End: 2025-08-01
Payer: COMMERCIAL

## 2025-08-01 PROCEDURE — ZZZZZ: CPT

## 2025-08-11 ENCOUNTER — RX RENEWAL (OUTPATIENT)
Age: 55
End: 2025-08-11

## 2025-08-14 ENCOUNTER — APPOINTMENT (OUTPATIENT)
Dept: INFECTIOUS DISEASE | Facility: CLINIC | Age: 55
End: 2025-08-14
Payer: MEDICARE

## 2025-08-14 ENCOUNTER — APPOINTMENT (OUTPATIENT)
Dept: INFECTIOUS DISEASE | Facility: CLINIC | Age: 55
End: 2025-08-14

## 2025-08-14 VITALS
HEIGHT: 68 IN | OXYGEN SATURATION: 97 % | SYSTOLIC BLOOD PRESSURE: 133 MMHG | TEMPERATURE: 98.4 F | HEART RATE: 89 BPM | DIASTOLIC BLOOD PRESSURE: 78 MMHG | BODY MASS INDEX: 26.98 KG/M2 | WEIGHT: 178 LBS

## 2025-08-14 DIAGNOSIS — M79.602 PAIN IN LEFT ARM: ICD-10-CM

## 2025-08-14 DIAGNOSIS — B20 HUMAN IMMUNODEFICIENCY VIRUS [HIV] DISEASE: ICD-10-CM

## 2025-08-14 PROCEDURE — G2211 COMPLEX E/M VISIT ADD ON: CPT

## 2025-08-14 PROCEDURE — 99214 OFFICE O/P EST MOD 30 MIN: CPT

## 2025-08-14 RX ORDER — VALACYCLOVIR 1 G/1
1 TABLET, FILM COATED ORAL
Qty: 10 | Refills: 5 | Status: ACTIVE | COMMUNITY
Start: 2025-08-14 | End: 1900-01-01

## 2025-08-18 DIAGNOSIS — Z01.20 ENCOUNTER FOR DENTAL EXAMINATION AND CLEANING WITHOUT ABNORMAL FINDINGS: ICD-10-CM

## 2025-08-22 ENCOUNTER — APPOINTMENT (OUTPATIENT)
Age: 55
End: 2025-08-22
Payer: COMMERCIAL

## 2025-08-22 PROCEDURE — ZZZZZ: CPT

## (undated) DEVICE — SENSOR O2 FINGER ADULT 24/CA

## (undated) DEVICE — POLY TRAP ETRAP

## (undated) DEVICE — ELCTR GROUNDING PAD ADULT COVIDIEN

## (undated) DEVICE — FORCEP RADIAL JAW 4 JUMBO 2.8MM 3.2MM 240CM ORANGE DISP

## (undated) DEVICE — TUBING SUCTION 20FT

## (undated) DEVICE — SUCTION YANKAUER NO CONTROL VENT

## (undated) DEVICE — FOLEY HOLDER STATLOCK 2 WAY ADULT

## (undated) DEVICE — IRRIGATOR BIO SHIELD

## (undated) DEVICE — BIOPSY FORCEP RADIAL JAW 4 STANDARD WITH NEEDLE

## (undated) DEVICE — CLAMP BX HOT RAD JAW 3

## (undated) DEVICE — SOL INJ NS 0.9% 500ML 2 PORT

## (undated) DEVICE — BRUSH COLONOSCOPY CYTOLOGY

## (undated) DEVICE — SNARE EXACTO COLD 9MMX230CM

## (undated) DEVICE — CATH IV SAFE BC 20G X 1.16" (PINK)

## (undated) DEVICE — SYR LUER LOK 50CC

## (undated) DEVICE — CATH IV SAFE BC 22G X 1" (BLUE)

## (undated) DEVICE — TUBING IV SET GRAVITY 3Y 100" MACRO

## (undated) DEVICE — PACK IV START WITH CHG

## (undated) DEVICE — TUBING SUCTION CONN 6FT STERILE